# Patient Record
Sex: FEMALE | Race: WHITE | NOT HISPANIC OR LATINO | Employment: FULL TIME | URBAN - METROPOLITAN AREA
[De-identification: names, ages, dates, MRNs, and addresses within clinical notes are randomized per-mention and may not be internally consistent; named-entity substitution may affect disease eponyms.]

---

## 2017-03-06 ENCOUNTER — ALLSCRIPTS OFFICE VISIT (OUTPATIENT)
Dept: OTHER | Facility: OTHER | Age: 22
End: 2017-03-06

## 2017-12-21 ENCOUNTER — GENERIC CONVERSION - ENCOUNTER (OUTPATIENT)
Dept: OTHER | Facility: OTHER | Age: 22
End: 2017-12-21

## 2017-12-21 ENCOUNTER — LAB REQUISITION (OUTPATIENT)
Dept: LAB | Facility: HOSPITAL | Age: 22
End: 2017-12-21
Payer: COMMERCIAL

## 2017-12-21 DIAGNOSIS — Z01.419 ENCOUNTER FOR GYNECOLOGICAL EXAMINATION WITHOUT ABNORMAL FINDING: ICD-10-CM

## 2017-12-21 PROCEDURE — G0145 SCR C/V CYTO,THINLAYER,RESCR: HCPCS | Performed by: PHYSICIAN ASSISTANT

## 2017-12-28 ENCOUNTER — GENERIC CONVERSION - ENCOUNTER (OUTPATIENT)
Dept: FAMILY MEDICINE CLINIC | Facility: CLINIC | Age: 22
End: 2017-12-28

## 2017-12-28 ENCOUNTER — GENERIC CONVERSION - ENCOUNTER (OUTPATIENT)
Dept: OTHER | Facility: OTHER | Age: 22
End: 2017-12-28

## 2017-12-29 LAB
LAB AP GYN PRIMARY INTERPRETATION: NORMAL
Lab: NORMAL

## 2018-01-10 NOTE — MISCELLANEOUS
Message   Recorded as Task   Date: 11/28/2016 09:39 AM, Created By: Braeden Nails   Task Name: Med Renewal Request   Assigned To: Jacklyn Woo   Regarding Patient: Veronica Stallings, Status: Active   CommentCrikiran Bruno - 28 Nov 2016 9:39 AM     TASK CREATED  Caller: Mariya Delarosa, Mother; Renew Medication  Pt has appt 12/08 w [facility]  Pt needs refill on BC rx  Mariya Delarosa (mother) is at [phone]   Saul Abraham - 28 Nov 2016 9:43 AM     TASK REASSIGNED: Previously Assigned To Mariangel Taylor - 28 Nov 2016 10:31 AM     TASK EDITED  what pharm? Saul Abraham - 69 Nov 2016 1:54 PM     TASK EDITED  sent to pharm        Active Problems    1  Encounter for routine gynecological examination (V72 31) (Z01 419)   2  Mild exercise-induced asthma (493 81) (J45 990)   3  Tricuspid valve disorders, non-rheumatic (424 2) (I36 9)    Current Meds   1  Junel Fe 24 1-20 MG-MCG(24) Oral Tablet; Take 1 tablet daily; Therapy: [id number] to (Last Rx:[id number])  Requested for: 21Oct2016; Status: ACTIVE -   Renewal Denied Ordered    Allergies    1  No Known Drug Allergies    Plan  Encounter for routine gynecological examination    · Junel Fe 24 1-20 MG-MCG(24) Oral Tablet;  Take 1 tablet daily    Signatures   Electronically signed by : Karin Navarro, ; Nov 28 2016  1:54PM EST                       (Author)

## 2018-01-13 VITALS
DIASTOLIC BLOOD PRESSURE: 64 MMHG | OXYGEN SATURATION: 99 % | WEIGHT: 134 LBS | BODY MASS INDEX: 21.03 KG/M2 | HEART RATE: 72 BPM | SYSTOLIC BLOOD PRESSURE: 104 MMHG | HEIGHT: 67 IN

## 2018-01-24 VITALS
BODY MASS INDEX: 21.38 KG/M2 | SYSTOLIC BLOOD PRESSURE: 124 MMHG | WEIGHT: 133 LBS | HEIGHT: 66 IN | DIASTOLIC BLOOD PRESSURE: 80 MMHG

## 2018-01-29 DIAGNOSIS — R12 HEARTBURN: Primary | ICD-10-CM

## 2018-01-29 RX ORDER — RANITIDINE 300 MG/1
TABLET ORAL
Qty: 60 TABLET | Refills: 0 | Status: SHIPPED | OUTPATIENT
Start: 2018-01-29 | End: 2018-10-05 | Stop reason: SDUPTHER

## 2018-02-05 ENCOUNTER — TELEPHONE (OUTPATIENT)
Dept: FAMILY MEDICINE CLINIC | Facility: CLINIC | Age: 23
End: 2018-02-05

## 2018-02-05 ENCOUNTER — EVALUATION (OUTPATIENT)
Dept: PHYSICAL THERAPY | Facility: CLINIC | Age: 23
End: 2018-02-05
Payer: COMMERCIAL

## 2018-02-05 DIAGNOSIS — Z98.890 S/P ACL RECONSTRUCTION: Primary | ICD-10-CM

## 2018-02-05 PROCEDURE — 97162 PT EVAL MOD COMPLEX 30 MIN: CPT | Performed by: PHYSICAL THERAPIST

## 2018-02-05 PROCEDURE — 97014 ELECTRIC STIMULATION THERAPY: CPT | Performed by: PHYSICAL THERAPIST

## 2018-02-05 PROCEDURE — 97110 THERAPEUTIC EXERCISES: CPT | Performed by: PHYSICAL THERAPIST

## 2018-02-05 PROCEDURE — G0283 ELEC STIM OTHER THAN WOUND: HCPCS | Performed by: PHYSICAL THERAPIST

## 2018-02-05 NOTE — LETTER
2018    Des Kennedy MD  [address] [place] [address]    Patient: Zaynab Chapa   YOB: 1995   Date of Visit: 2018       Dear Dr Dhaliwal Sake:    Please review the attached Plan of Care from Kristen Kwok's recent visit  Please verify that you agree therapy should continue by signing the attached document and sending it back to our office  If you have any questions or concerns, please don't hesitate to call  Sincerely,    Julian Gaviria, PT      Referring Provider:      I certify that I have read the below Plan of Care and certify the need for these services furnished under this plan of treatment while under my care  Des Kennedy MD  [facility] 31: [phone]          PT Evaluation     Today's date: 2018  Patient name: Zaynab Chapa  : 1995  MRN: [id number]  Referring provider: Alwin Cogan, MD  Dx:   Encounter Diagnosis   Name Primary?  S/P ACL reconstruction Yes                  Assessment  Impairments: abnormal gait, abnormal muscle firing, abnormal or restricted ROM and pain with function    Assessment details: Pt should benefit from PT s/p ADL reconstruction  Understanding of Dx/Px/POC: excellent  Plan    Planned modality interventions: electrical stimulation/Russian stimulation and cryotherapy  Planned therapy interventions: balance/weight bearing training, stretching, strengthening, neuromuscular re-education, therapeutic activities and therapeutic exercise  Frequency: 3x week  Duration in visits: 20  Duration in weeks: 12  Treatment plan discussed with: patient        Subjective Evaluation    History of Present Illness  Date of surgery: 2018  Mechanism of injury: Basketball, planted and twisted knee    Senior at [employer], plays basketball and soccer  Quality of life: excellent    Pain  Current pain ratin          Objective Active Range of Motion   Left Knee   Flexion: 85 degrees   Extension: -10 degrees     Additional Active Range of Motion Details  Supine heel slide    Strength/Myotome Testing     Left Knee   Quadriceps contraction: poor    Ambulation     Observational Gait     Additional Observational Gait Details  Antalgic gait with crutches decreased ROM, lacks full extension      Precautions: ACL    Daily Treatment Diary     Manual  2/5            Patellar mobs x                                                                    Exercise Diary              bike 10'            LAQ no wt 20            Knee ext isomet 90, 60, 45 deg             march 5'            Retro walk 5'            SLB 4'            Calf raise on biodex 30            SLR x 4             TKE             Side step             Step ups             squats                                                                                                                         Modalities              NMES quad 10'

## 2018-02-05 NOTE — PROGRESS NOTES
PT Evaluation     Today's date: 2018  Patient name: Fern Boogie  : 1995  MRN: [id number]  Referring provider: Martin Desai MD  Dx:   Encounter Diagnosis   Name Primary?  S/P ACL reconstruction Yes                  Assessment  Impairments: abnormal gait, abnormal muscle firing, abnormal or restricted ROM and pain with function    Assessment details: Pt should benefit from PT s/p ADL reconstruction  Understanding of Dx/Px/POC: excellent  Plan    Planned modality interventions: electrical stimulation/Russian stimulation and cryotherapy  Planned therapy interventions: balance/weight bearing training, stretching, strengthening, neuromuscular re-education, therapeutic activities and therapeutic exercise  Frequency: 3x week  Duration in visits: 20  Duration in weeks: 12  Treatment plan discussed with: patient        Subjective Evaluation    History of Present Illness  Date of surgery: 2018  Mechanism of injury: Basketball, planted and twisted knee    Senior at [employer], plays basketball and soccer  Quality of life: excellent    Pain  Current pain ratin          Objective     Active Range of Motion   Left Knee   Flexion: 85 degrees   Extension: -10 degrees     Additional Active Range of Motion Details  Supine heel slide    Strength/Myotome Testing     Left Knee   Quadriceps contraction: poor    Ambulation     Observational Gait     Additional Observational Gait Details  Antalgic gait with crutches decreased ROM, lacks full extension      Precautions: ACL    Daily Treatment Diary     Manual              Patellar mobs x                                                                    Exercise Diary              bike 10'            LAQ no wt 20            Knee ext isomet 90, 60, 45 deg             march 5'            Retro walk 5'            SLB 4'            Calf raise on biodex 30            SLR x 4             TKE             Side step             Step ups             squats Modalities              NMES quad 10'

## 2018-02-06 ENCOUNTER — TRANSCRIBE ORDERS (OUTPATIENT)
Dept: PHYSICAL THERAPY | Facility: CLINIC | Age: 23
End: 2018-02-06

## 2018-02-06 DIAGNOSIS — Z98.890 PERSONAL HISTORY OF SURGERY TO HEART AND GREAT VESSELS, PRESENTING HAZARDS TO HEALTH: Primary | ICD-10-CM

## 2018-02-07 ENCOUNTER — OFFICE VISIT (OUTPATIENT)
Dept: PHYSICAL THERAPY | Facility: CLINIC | Age: 23
End: 2018-02-07
Payer: COMMERCIAL

## 2018-02-07 DIAGNOSIS — S83.512D RUPTURE OF ANTERIOR CRUCIATE LIGAMENT OF LEFT KNEE, SUBSEQUENT ENCOUNTER: ICD-10-CM

## 2018-02-07 PROCEDURE — 97010 HOT OR COLD PACKS THERAPY: CPT

## 2018-02-07 PROCEDURE — 97116 GAIT TRAINING THERAPY: CPT

## 2018-02-07 PROCEDURE — 97530 THERAPEUTIC ACTIVITIES: CPT

## 2018-02-07 PROCEDURE — 97110 THERAPEUTIC EXERCISES: CPT

## 2018-02-07 NOTE — PROGRESS NOTES
Daily Note     Today's date: 2018  Patient name: Debbie Pradhan  : 1995  MRN: [id number]  Referring provider: Yris Landurm MD  Dx: No diagnosis found  Subjective: Patient complains the left knee is feeling stiff  Objective: See treatment diary below  Precautions: L ACL repair 18    Daily Treatment Diary   X=performed    Manual             Patellar mobs x x               Exercise Diary   18           bike 10' x           LAQ no wt 20 1x20           Knee ext isomet 90, 60, 45 deg  5"x10           march 5' 4'           Retro walk 5' 4'           SLB 4'            Calf raise 30 2x30           SLR x 4  2x10           TKE  Green  1x20           Side step  4'           Step ups  4" x 20           squats                 Modalities              NMES quad 10' 10'           CP post tx  10'             Assessment: Fatigue reported throughout therapeutic exercise program today  Plan: Continue per plan of care

## 2018-02-09 ENCOUNTER — OFFICE VISIT (OUTPATIENT)
Dept: PHYSICAL THERAPY | Facility: CLINIC | Age: 23
End: 2018-02-09
Payer: COMMERCIAL

## 2018-02-09 DIAGNOSIS — M25.562 ACUTE PAIN OF LEFT KNEE: ICD-10-CM

## 2018-02-09 PROCEDURE — 97530 THERAPEUTIC ACTIVITIES: CPT

## 2018-02-09 PROCEDURE — 97010 HOT OR COLD PACKS THERAPY: CPT

## 2018-02-09 PROCEDURE — 97110 THERAPEUTIC EXERCISES: CPT

## 2018-02-09 NOTE — PROGRESS NOTES
Daily Note     Today's date: 2018  Patient name: Fern Boogie  : 1995  MRN: [id number]  Referring provider: Martin Desai MD  Dx: No diagnosis found  Subjective: Patient complains the left knee felt sore after the last visit however "it wasn't bad "    Objective: See treatment diary below  Precautions: L ACL repair 18    Daily Treatment Diary   X=performed    Manual  18          Patellar mobs x x x              Exercise Diary   18          bike 10' x x          LAQ no wt 20 1x20 x          Knee ext isomet 90, 60, 45 deg  5"x10 x          march 5' 4' x          Retro walk 5' 4' x          SLB 4'            Calf raises 30 2x30 x          SLR x 4  2x10 x          TKE  Green  1x20 2x20          Side step  4' x          Step ups  4" x 20 x          squats   2x10              Modalities             NMES quad 10' 10' x          CP post tx  10' x            Assessment: Additional squats are performed with complaints of fatigue  Patient tolerates NMES well therefore provided her with unit for home use  Plan: Continue per plan of care

## 2018-02-12 ENCOUNTER — OFFICE VISIT (OUTPATIENT)
Dept: PHYSICAL THERAPY | Facility: CLINIC | Age: 23
End: 2018-02-12
Payer: COMMERCIAL

## 2018-02-12 DIAGNOSIS — Z98.890 S/P ACL RECONSTRUCTION: Primary | ICD-10-CM

## 2018-02-12 PROCEDURE — 97112 NEUROMUSCULAR REEDUCATION: CPT

## 2018-02-12 PROCEDURE — 97010 HOT OR COLD PACKS THERAPY: CPT

## 2018-02-12 PROCEDURE — 97530 THERAPEUTIC ACTIVITIES: CPT

## 2018-02-12 PROCEDURE — 97110 THERAPEUTIC EXERCISES: CPT

## 2018-02-12 NOTE — PROGRESS NOTES
Daily Note     Today's date: 2018  Patient name: Kathy Alston  : 1995  MRN: [id number]  Referring provider: Brigette Whitmore MD  Dx:   Encounter Diagnosis   Name Primary?  S/P ACL reconstruction Yes                  Subjective: Patient reports her left knee felt "a little bit sore" after Friday's PT visit  Patient reports she was able to successfully use the home NMES unit over the weekend  Objective: See treatment diary below  Precautions: L ACL repair 18    Daily Treatment Diary   X=performed    Manual  18         Patellar mobs x x x x             Exercise Diary   18         bike 10' x x x         LAQ no wt 20 1x20 x 3x10         Knee ext isomet 90, 60, 45 deg  5"x10 x x         march 5' 4' x x         Retro walk 5' 4' x x         SLB 4'   30"x2         Calf raises 30 2x30 x x         SLR x 4  2x10 x 3x10         TKE  Green  1x20 2x20 x         Side step  4' x x         Step ups  4" x 20 x x         squats   2x10 x         Heel slides    5"x20             Modalities            NMES quad 10' 10' x HEP         CP post tx  10' x x           Assessment: Added single leg balance as noted with complaints of left lower extremity fatigue  Plan: Continue per plan of care

## 2018-02-14 ENCOUNTER — OFFICE VISIT (OUTPATIENT)
Dept: PHYSICAL THERAPY | Facility: CLINIC | Age: 23
End: 2018-02-14
Payer: COMMERCIAL

## 2018-02-14 DIAGNOSIS — M25.562 ACUTE PAIN OF LEFT KNEE: ICD-10-CM

## 2018-02-14 PROCEDURE — 97140 MANUAL THERAPY 1/> REGIONS: CPT

## 2018-02-14 PROCEDURE — 97010 HOT OR COLD PACKS THERAPY: CPT

## 2018-02-14 PROCEDURE — 97110 THERAPEUTIC EXERCISES: CPT

## 2018-02-14 NOTE — PROGRESS NOTES
Daily Note     Today's date: 2018  Patient name: Janet Valente  : 1995  MRN: [id number]  Referring provider: Jason Soto MD  Dx:   No diagnosis found  Subjective: No significant changes in left knee pain to report since the last visit  Objective: See treatment diary below  Precautions: L ACL repair 18    Daily Treatment Diary   X=performed    Manual  18        Patellar mobs x x x x x            Exercise Diary   18        bike 10' x x x x        LAQ no wt 20 1x20 x 3x10 x        Knee ext isomet 90, 60, 45 deg  5"x10 x x x        ' 4' x x x        Retro walk 5' 4' x x x        SLB 4'   30"x2 30"x3        Calf raises 30 2x30 x x x        SLR x 4  2x10 x 3x10 x        TKE  Green  1x20 2x20 x Black  3x10        Side step  4' x x x        Step ups  4" x 20 x x 6" x 20        squats   2x10 x x        Heel slides    5"x20 x        Prone quad stretch     30"x4            Modalities           NMES quad 10' 10' x HEP HEP        CP post tx  10' x x x          Assessment: Additional prone quad stretching is tolerated well  Patient reports slr exercises are becoming easier to perform  Plan: Continue per plan of care

## 2018-02-16 ENCOUNTER — OFFICE VISIT (OUTPATIENT)
Dept: PHYSICAL THERAPY | Facility: CLINIC | Age: 23
End: 2018-02-16
Payer: COMMERCIAL

## 2018-02-16 DIAGNOSIS — M25.562 ACUTE PAIN OF LEFT KNEE: ICD-10-CM

## 2018-02-16 PROCEDURE — 97112 NEUROMUSCULAR REEDUCATION: CPT

## 2018-02-16 PROCEDURE — 97110 THERAPEUTIC EXERCISES: CPT

## 2018-02-16 PROCEDURE — 97530 THERAPEUTIC ACTIVITIES: CPT

## 2018-02-16 NOTE — PROGRESS NOTES
Daily Note     Today's date: 2018  Patient name: Sundar Valladares  : 1995  MRN: [id number]  Referring provider: Elda Alpers, MD  Dx:   No diagnosis found  Subjective: No significant changes in left knee pain to report since the last visit  Objective: See treatment diary below  Precautions: L ACL repair 18    Daily Treatment Diary   X=performed    Manual  18       Patellar mobs x x x x x x           Exercise Diary   18       bike 10' x x x x x       LAQ no wt 20 1x20 x 3x10 x x       Knee ext isomet 90, 60, 45 deg  5"x10 x x x x       ' 4' x x x x       Retro walk 5' 4' x x x x       SLB 4'   30"x2 30"x3 x       Calf raises 30 2x30 x x x x       SLR x 4  2x10 x 3x10 x x       TKE  Green  1x20 2x20 x Black  3x10 x       Side step  4' x x x Red tb       Step ups  4" x 20 x x 6" x 20 3x10       squats   2x10 x x 3x10       Heel slides    5"x20 x x       Prone quad stretch     30"x4 x           Modalities          NMES quad 10' 10' x HEP HEP HEP       CP post tx  10' x x x HEP         Assessment: Progressed to performing sidestepping with the red theraband without complaints  Plan: Continue per plan of care

## 2018-02-19 ENCOUNTER — OFFICE VISIT (OUTPATIENT)
Dept: PHYSICAL THERAPY | Facility: CLINIC | Age: 23
End: 2018-02-19
Payer: COMMERCIAL

## 2018-02-19 DIAGNOSIS — M25.562 ACUTE PAIN OF LEFT KNEE: Primary | ICD-10-CM

## 2018-02-19 DIAGNOSIS — S83.512D RUPTURE OF ANTERIOR CRUCIATE LIGAMENT OF LEFT KNEE, SUBSEQUENT ENCOUNTER: ICD-10-CM

## 2018-02-19 DIAGNOSIS — Z98.890 S/P ACL RECONSTRUCTION: ICD-10-CM

## 2018-02-19 PROCEDURE — 97112 NEUROMUSCULAR REEDUCATION: CPT | Performed by: PHYSICAL THERAPIST

## 2018-02-19 PROCEDURE — 97140 MANUAL THERAPY 1/> REGIONS: CPT | Performed by: PHYSICAL THERAPIST

## 2018-02-19 PROCEDURE — 97110 THERAPEUTIC EXERCISES: CPT | Performed by: PHYSICAL THERAPIST

## 2018-02-19 PROCEDURE — 97530 THERAPEUTIC ACTIVITIES: CPT | Performed by: PHYSICAL THERAPIST

## 2018-02-19 NOTE — PROGRESS NOTES
Daily Note     Today's date: 2018  Patient name: Fortino Randall  : 1995  MRN: [id number]  Referring provider: Jefry Pearce MD  Dx:   Encounter Diagnoses   Name Primary?  Acute pain of left knee Yes    S/P ACL reconstruction     Rupture of anterior cruciate ligament of left knee, subsequent encounter                   Subjective: reports some discomfort in patellar tendon region  Objective: See treatment diary below  Precautions: L ACL repair 18    Daily Treatment Diary   X=performed    Manual  18      Patellar mobs x x x x x x           Exercise Diary   18            bike 10' x x x x x random           LAQ no wt 20 1x20 x 3x10 x x Apollo 2 plates I82           Knee ext isomet 90, 60, 45 deg  5"x10 x x x x x           march 5' 4' x x x x np           Retro walk 5' 4' x x x x np           SLB 4'   30"x2 30"x3 x x           Calf raises 30 2x30 x x x x x           SLR x 4  2x10 x 3x10 x x            TKE  Green  1x20 2x20 x Black  3x10 x x           Side step  4' x x x Red tb x           Step ups  4" x 20 x x 6" x 20 3x10 x           squats   2x10 x x 3x10 x           Heel slides    5"x20 x x HEP           Ham curls apollo       2 plates B27           Leg press       2 plates M34                                                                                   Prone quad stretch     30"x4 x 1 min x3               Modalities          NMES quad 10' 10' x HEP HEP HEP np      CP post tx  10' x x x HEP np        Assessment: fatigued and challenged with increased intensity and new exs today  Plan: Continue per plan of care  Progress treatment as tolerated

## 2018-02-21 ENCOUNTER — OFFICE VISIT (OUTPATIENT)
Dept: PHYSICAL THERAPY | Facility: CLINIC | Age: 23
End: 2018-02-21
Payer: COMMERCIAL

## 2018-02-21 DIAGNOSIS — Z98.890 S/P ACL RECONSTRUCTION: ICD-10-CM

## 2018-02-21 DIAGNOSIS — M25.562 ACUTE PAIN OF LEFT KNEE: Primary | ICD-10-CM

## 2018-02-21 DIAGNOSIS — S83.512D RUPTURE OF ANTERIOR CRUCIATE LIGAMENT OF LEFT KNEE, SUBSEQUENT ENCOUNTER: ICD-10-CM

## 2018-02-21 PROCEDURE — 97112 NEUROMUSCULAR REEDUCATION: CPT | Performed by: PHYSICAL THERAPIST

## 2018-02-21 PROCEDURE — 97110 THERAPEUTIC EXERCISES: CPT | Performed by: PHYSICAL THERAPIST

## 2018-02-21 NOTE — PROGRESS NOTES
Daily Note     Today's date: 2018  Patient name: Neeru Luis  : 1995  MRN: [id number]  Referring provider: Faiza Anguiano MD  Dx:   Encounter Diagnoses   Name Primary?  Acute pain of left knee Yes    S/P ACL reconstruction     Rupture of anterior cruciate ligament of left knee, subsequent encounter                   Subjective: Patient stated no significant pain prior to treatment session; patient states physician D/C brace  Objective: See treatment diary below  Precautions: L ACL repair 18    Daily Treatment Diary   X=performed    Manual  18     Patellar mobs x x x x x x  KK         Exercise Diary   18          bike 10' x x x x x random random          LAQ no wt 20 1x20 x 3x10 x x Apollo 2 plates N96 Apollo 2 plates A45          Knee ext isomet 90, 60, 45 deg  5"x10 x x x x x 5"x10          march 5' 4' x x x x np np          Retro walk 5' 4' x x x x np np          SLB 4'   30"x2 30"x3 x x 30"x3          Calf raises 30 2x30 x x x x x 2x30          SLR x 4  2x10 x 3x10 x x  3x10          TKE  Green  1x20 2x20 x Black  3x10 x x Black 3x10          Side step  4' x x x Red tb x Red tb          Step ups  4" x 20 x x 6" x 20 3x10 x 10" 3x10          squats   2x10 x x 3x10 x 3x10          Heel slides    5"x20 x x HEP HEP          Ham curls apollo       2 plates V06 2 plates 37F          Leg press       2 plates Q77 1 leg [id number]                                                                                  Prone quad stretch     30"x4 x 1 min x3 1' x3              Modalities        NMES quad 10' 10' x HEP HEP HEP np np     CP post tx  10' x x x HEP np np       Assessment: Patient performed progressions to 1 leg on leg press and to 10" step without c/o pain; no c/o pain at completion of treatment session  Plan: Continue per plan of care    Progress treatment as tolerated

## 2018-02-23 ENCOUNTER — OFFICE VISIT (OUTPATIENT)
Dept: PHYSICAL THERAPY | Facility: CLINIC | Age: 23
End: 2018-02-23
Payer: COMMERCIAL

## 2018-02-23 DIAGNOSIS — Z98.890 S/P ACL RECONSTRUCTION: Primary | ICD-10-CM

## 2018-02-23 PROCEDURE — 97112 NEUROMUSCULAR REEDUCATION: CPT

## 2018-02-23 PROCEDURE — 97530 THERAPEUTIC ACTIVITIES: CPT

## 2018-02-23 PROCEDURE — 97110 THERAPEUTIC EXERCISES: CPT

## 2018-02-23 NOTE — PROGRESS NOTES
Daily Note     Today's date: 2018  Patient name: Gavi Fowler  : 1995  MRN: [id number]  Referring provider: Raeann Mohs, MD  Dx:   No diagnosis found  Subjective: Patient reports continued improvements in left knee pain  Objective: See treatment diary below  Precautions: L ACL repair 18    Daily Treatment Diary   X=performed    Manual  18    Patellar mobs x x x x x x  KK x        Exercise Diary   18         bike 10' x x x x x random random x         LAQ no wt 20 1x20 x 3x10 x x Apollo 2 plates O96 Apollo 2 plates W22 x         Knee ext isomet 90, 60, 45 deg  5"x10 x x x x x 5"x10 Manual  Resist   5"x10         march 5' 4' x x x x np np NP         Retro walk 5' 4' x x x x np np NP         SLB 4'   30"x2 30"x3 x x 30"x3 x         Calf raises 30 2x30 x x x x x 2x30 x         SLR x 4  2x10 x 3x10 x x  3x10 x         TKE  Green  1x20 2x20 x Black  3x10 x x Black 3x10 x         Side step  4' x x x Red tb x Red tb x         Step ups  4" x 20 x x 6" x 20 3x10 x 10" 3x10 x         squats   2x10 x x 3x10 x 3x10 x         Heel slides    5"x20 x x HEP HEP          Ham curls apollo       2 plates J50 2 plates 07D x         Leg press       2 plates B71 1 leg [id number] x                                                                                 Prone quad stretch     30"x4 x 1 min x3 1' x3 x             Modalities       NMES quad 10' 10' x HEP HEP HEP np np NP    CP post tx  10' x x x HEP np np NP      Assessment: Patient complains of left lower extremity fatigue when performing the single leg balance  Plan: Continue per plan of care  Progress treatment as tolerated

## 2018-02-26 ENCOUNTER — OFFICE VISIT (OUTPATIENT)
Dept: PHYSICAL THERAPY | Facility: CLINIC | Age: 23
End: 2018-02-26
Payer: COMMERCIAL

## 2018-02-26 DIAGNOSIS — Z98.890 S/P ACL RECONSTRUCTION: Primary | ICD-10-CM

## 2018-02-26 PROCEDURE — 97112 NEUROMUSCULAR REEDUCATION: CPT | Performed by: PHYSICAL THERAPIST

## 2018-02-26 PROCEDURE — 97110 THERAPEUTIC EXERCISES: CPT | Performed by: PHYSICAL THERAPIST

## 2018-02-26 NOTE — PROGRESS NOTES
Daily Note     Today's date: 2018  Patient name: Brigitte Cotto  : 1995  MRN: [id number]  Referring provider: Chen Cherry MD  Dx:   Encounter Diagnosis   Name Primary?  S/P ACL reconstruction Yes                  Subjective: Patient reports continued improvements in left knee pain  Objective: See treatment diary below  Precautions: L ACL repair 18    Daily Treatment Diary   X=performed    Manual  18   Patellar mobs x x x x x x  KK x x       Exercise Diary   18        bike 10' x x x x x random random x x        LAQ no wt 20 1x20 x 3x10 x x Apollo 2 plates Y33 Apollo 2 plates K50 x x        Knee ext isomet 90, 60, 45 deg  5"x10 x x x x x 5"x10 Manual  Resist   5"x10 np        ' 4' x x x x np np NP         Monster walk          12'x6        SLB 4'   30"x2 30"x3 x x 30"x3 x x        Calf raises 30 2x30 x x x x x 2x30 x single 3x10        SLR x 4  2x10 x 3x10 x x  3x10 x         TKE  Green  1x20 2x20 x Black  3x10 x x Black 3x10 x         Side step  4' x x x Red tb x Red tb x 1x6'        Step ups  4" x 20 x x 6" x 20 3x10 x 10" 3x10 x x        squats   2x10 x x 3x10 x 3x10 x x        Heel slides    5"x20 x x HEP HEP          Ham curls apollo       2 plates Q32 2 plates 11F x x        Leg press single       2 plates P89 1 leg [id number] x 2 plates 4V89        Wall slides hold 10 sec          10        stepper          5 min                                            Prone quad stretch     30"x4 x 1 min x3 1' x3 x x            Modalities       NMES quad 10' 10' x HEP HEP HEP np np NP    CP post tx  10' x x x HEP np np NP      Assessment: Patient complains of left lower extremity fatigue when performing the single leg balance  Plan: Continue per plan of care  Progress treatment as tolerated

## 2018-02-28 ENCOUNTER — OFFICE VISIT (OUTPATIENT)
Dept: PHYSICAL THERAPY | Facility: CLINIC | Age: 23
End: 2018-02-28
Payer: COMMERCIAL

## 2018-02-28 DIAGNOSIS — Z98.890 S/P ACL RECONSTRUCTION: Primary | ICD-10-CM

## 2018-02-28 PROCEDURE — 97530 THERAPEUTIC ACTIVITIES: CPT | Performed by: PHYSICAL THERAPIST

## 2018-02-28 PROCEDURE — 97112 NEUROMUSCULAR REEDUCATION: CPT | Performed by: PHYSICAL THERAPIST

## 2018-02-28 PROCEDURE — 97110 THERAPEUTIC EXERCISES: CPT | Performed by: PHYSICAL THERAPIST

## 2018-02-28 NOTE — PROGRESS NOTES
Daily Note     Today's date: 2018  Patient name: Carlos Loya  : 1995  MRN: [id number]  Referring provider: Sandro Mariano MD  Dx:   Encounter Diagnosis   Name Primary?  S/P ACL reconstruction Yes                  Subjective: Patient reports continued improvements in left knee pain  Objective: See treatment diary below  Precautions: L ACL repair 18    Daily Treatment Diary   X=performed    Manual  18   Patellar mobs x x x x x x  KK x x       Exercise Diary   18       bike 10' x x x x x random random x x x       LAQ no wt 20 1x20 x 3x10 x x [place] 2 plates O06 Apollo 2 plates G54 x x 2 [id number]                         march 5' 4' x x x x np np NP         Monster walk          12'x6 x       SLB 4'   30"x2 30"x3 x x 30"x3 x x x       Calf raises 30 2x30 x x x x x 2x30 x single 3x10 x       SLR x 4  2x10 x 3x10 x x  3x10 x  1# 3x10       TKE  Green  1x20 2x20 x Black  3x10 x x Black 3x10 x         Side step  4' x x x Red tb x Red tb x 1x6' 12'x6       Step ups  4" x 20 x x 6" x 20 3x10 x 10" 3x10 x x x       squats   2x10 x x 3x10 x 3x10 x x 30       Heel slides    5"x20 x x HEP HEP          Ham curls apollo       2 plates Q78 2 plates 58U x x x       Leg press single       2 plates S51 1 leg [id number] x 2 plates 0H05 3 plates       Wall slides hold 10 sec          10 15       stepper          5 min 5                                           Prone quad stretch     30"x4 x 1 min x3 1' x3 x x x           Modalities       NMES quad 10' 10' x HEP HEP HEP np np NP    CP post tx  10' x x x HEP np np NP      Assessment: Patient complains of left lower extremity fatigue when performing the single leg balance  Plan: Continue per plan of care  Progress treatment as tolerated

## 2018-03-02 ENCOUNTER — APPOINTMENT (OUTPATIENT)
Dept: PHYSICAL THERAPY | Facility: CLINIC | Age: 23
End: 2018-03-02
Payer: COMMERCIAL

## 2018-03-05 ENCOUNTER — APPOINTMENT (OUTPATIENT)
Dept: PHYSICAL THERAPY | Facility: CLINIC | Age: 23
End: 2018-03-05
Payer: COMMERCIAL

## 2018-03-12 ENCOUNTER — OFFICE VISIT (OUTPATIENT)
Dept: PHYSICAL THERAPY | Facility: CLINIC | Age: 23
End: 2018-03-12
Payer: COMMERCIAL

## 2018-03-12 DIAGNOSIS — Z98.890 S/P ACL RECONSTRUCTION: Primary | ICD-10-CM

## 2018-03-12 DIAGNOSIS — M25.562 ACUTE PAIN OF LEFT KNEE: ICD-10-CM

## 2018-03-12 DIAGNOSIS — S83.512D RUPTURE OF ANTERIOR CRUCIATE LIGAMENT OF LEFT KNEE, SUBSEQUENT ENCOUNTER: ICD-10-CM

## 2018-03-12 PROCEDURE — 97112 NEUROMUSCULAR REEDUCATION: CPT | Performed by: PHYSICAL THERAPIST

## 2018-03-12 PROCEDURE — 97110 THERAPEUTIC EXERCISES: CPT | Performed by: PHYSICAL THERAPIST

## 2018-03-12 NOTE — PROGRESS NOTES
Daily Note     Today's date: 3/12/2018  Patient name: Zaynab Chapa  : 1995  MRN: [id number]  Referring provider: Alwin Cogan, MD  Dx:   Encounter Diagnoses   Name Primary?  S/P ACL reconstruction Yes    Acute pain of left knee     Rupture of anterior cruciate ligament of left knee, subsequent encounter                   Subjective: Patient reports continued improvements in left knee pain  Objective: See treatment diary below  Precautions: L ACL repair 18    Daily Treatment Diary   X=performed        Exercise Diary                   bike [place] L6 10'          x       LAQ  3 plates 3Q94          2 [id number]                                           Monster walk Green x6          x       SLB           x       Calf raises single 3x10          x       SLR standin ways Green to fatigue          1# 3x10       TKE np                 Side step g x6          12'x6       Step ups: frwd, side 10" x30          x       Squats - single 3x10          30                         Ham curls apollo 3 plates 7H66          x       Leg press single 3 pl 3x10          3 plates       Wall slides hold 10 sec 2x10          15       stepper 5'          5       biodex balance 4'                 Dead lifts nv                 Prone quad stretch 30" x3          x             Assessment: Patient complains of left lower extremity fatigue when performing the single leg balance  Plan: Continue per plan of care  Progress treatment as tolerated

## 2018-03-14 ENCOUNTER — OFFICE VISIT (OUTPATIENT)
Dept: PHYSICAL THERAPY | Facility: CLINIC | Age: 23
End: 2018-03-14
Payer: COMMERCIAL

## 2018-03-14 DIAGNOSIS — S83.512D RUPTURE OF ANTERIOR CRUCIATE LIGAMENT OF LEFT KNEE, SUBSEQUENT ENCOUNTER: ICD-10-CM

## 2018-03-14 DIAGNOSIS — M25.562 ACUTE PAIN OF LEFT KNEE: ICD-10-CM

## 2018-03-14 DIAGNOSIS — Z98.890 S/P ACL RECONSTRUCTION: Primary | ICD-10-CM

## 2018-03-14 PROCEDURE — 97112 NEUROMUSCULAR REEDUCATION: CPT | Performed by: PHYSICAL THERAPIST

## 2018-03-14 PROCEDURE — 97110 THERAPEUTIC EXERCISES: CPT | Performed by: PHYSICAL THERAPIST

## 2018-03-14 NOTE — PROGRESS NOTES
Daily Note     Today's date: 3/14/2018  Patient name: Carlos Loya  : 1995  MRN: [id number]  Referring provider: Sandro Mariano MD  Dx:   Encounter Diagnoses   Name Primary?  S/P ACL reconstruction Yes    Acute pain of left knee     Rupture of anterior cruciate ligament of left knee, subsequent encounter        Start Time: 1400  Stop Time: 1520  Total time in clinic (min): 80 minutes    Subjective: Patient reports mild "stiffness" in her left knee prior to session  She reports 0/10 left knee pain pre/post session  Objective: See treatment diary below  Precautions: L ACL repair 18    Daily Treatment Diary   X=performed        Exercise Diary   3/14                bike [place] L6 10' Random  L6 x 10 min                LAQ  3 plates 9P11 3 plates  0/22                                                    Monster walk Green x6 Green   X 6                SLB                  Calf raises single 3x10 3x10                SLR standin ways Green to fatigue Green TB to fatigue   B/L                TKE np                 Side step g x6 Green   6 laps                Step ups: frwd, side 10" x30 10" step  X 30 ea                Squats - single 3x10 3x10                                  Ham curls apollo 3 plates 9U17 3 plates  7U71                Leg press single 3 pl 3x10 3 plates  9L61                Wall slides hold 10 sec 2x10 2x10                stepper 5' 5 min  Speed 30                biodex balance 4' Catch Game x 4min                Dead lifts nv                 Prone quad stretch 30" x3 30" x 3                      Assessment: Patient demonstrated good overall knowledge, tolerance and performance with current program reporting no left knee pain pre/post session  Pt reported mild fatigue with completion of strengthening program  Pt will benefit from continued skilled PT services in order to address her remaining limitations and to restore prior level of function      Plan: Continue per plan of care  Progress treatment as tolerated

## 2018-03-16 ENCOUNTER — OFFICE VISIT (OUTPATIENT)
Dept: PHYSICAL THERAPY | Facility: CLINIC | Age: 23
End: 2018-03-16
Payer: COMMERCIAL

## 2018-03-16 DIAGNOSIS — M25.562 ACUTE PAIN OF LEFT KNEE: ICD-10-CM

## 2018-03-16 DIAGNOSIS — Z98.890 S/P ACL RECONSTRUCTION: Primary | ICD-10-CM

## 2018-03-16 DIAGNOSIS — S83.512D RUPTURE OF ANTERIOR CRUCIATE LIGAMENT OF LEFT KNEE, SUBSEQUENT ENCOUNTER: ICD-10-CM

## 2018-03-16 PROCEDURE — 97112 NEUROMUSCULAR REEDUCATION: CPT

## 2018-03-16 PROCEDURE — 97110 THERAPEUTIC EXERCISES: CPT

## 2018-03-16 NOTE — PROGRESS NOTES
Daily Note     Today's date: 3/16/2018  Patient name: Severa Calkins  : 1995  MRN: [id number]  Referring provider: Mary Philippe MD  Dx:   Encounter Diagnosis     ICD-10-CM    1  S/P ACL reconstruction Z98 890    2  Acute pain of left knee M25 562    3  Rupture of anterior cruciate ligament of left knee, subsequent encounter S83 512D                   Subjective: Patient reported she is doing well  Was a little sore after LV  Notices stiffness in knee especially when it gets colder out  Objective: See treatment diary below      Assessment: No pain reported during today's activities  Patient is progressing well  Reports she tends to be too careful  Patient instructed she is able to perform more HS and calf stretching as she was unsure if she was able to do stretches other than performed in PT  Tolerated treatment well  Progressed per TE chart  Patient exhibited good technique with therapeutic exercises and would benefit from continued PT      Plan: Continue per plan of care       Exercise Diary    3/14  3/16                           bike [place] L6 10' Random  L6 x 10 min  Random  L6 x 10 min                           LAQ  3 plates 1A36 3 plates    3 plates  3/41                            Ball toss     Blue foam 4# 3x30                                                             Monster walk Green x6 Green   X 6  Blue x6                           SLB                                 Calf raises single 3x10 3x10  3x10                           SLR standin ways Green to fatigue Green TB to fatigue   B/L  Blue B/L                           TKE np                               Side step g x6 Green   6 laps  Blue 6 laps                           Step ups: frwd, side 10" x30 10" step  X 30 ea  10" step  X 30 ea                           Squats - single 3x10 3x10  3x10                                                             Ham curls apollo 3 plates 0H57 3 plates  8A16  3 plates  1K90                           Leg press single 3 pl 3x10 3 plates  2E59  3 plates  4C87                           Wall slides hold 10 sec 2x10 2x10  2x10                           stepper 5' 5 min  Speed 30  5 min  Speed 30                           biodex balance 4' Catch Game x 4min  Catch Game x 4min                           Dead lifts nv                               Prone quad stretch 30" x3 30" x 3  30" x 3

## 2018-03-19 ENCOUNTER — OFFICE VISIT (OUTPATIENT)
Dept: PHYSICAL THERAPY | Facility: CLINIC | Age: 23
End: 2018-03-19
Payer: COMMERCIAL

## 2018-03-19 DIAGNOSIS — Z98.890 S/P ACL RECONSTRUCTION: Primary | ICD-10-CM

## 2018-03-19 DIAGNOSIS — S83.512D RUPTURE OF ANTERIOR CRUCIATE LIGAMENT OF LEFT KNEE, SUBSEQUENT ENCOUNTER: ICD-10-CM

## 2018-03-19 DIAGNOSIS — M25.562 ACUTE PAIN OF LEFT KNEE: ICD-10-CM

## 2018-03-19 PROCEDURE — 97110 THERAPEUTIC EXERCISES: CPT | Performed by: PHYSICAL THERAPIST

## 2018-03-19 NOTE — PROGRESS NOTES
Daily Note     Today's date: 3/19/2018  Patient name: Hermelinda Giang  : 1995  MRN: [id number]  Referring provider: Gabe Russo MD  Dx:   Encounter Diagnosis     ICD-10-CM    1  S/P ACL reconstruction Z98 890    2  Acute pain of left knee M25 562    3  Rupture of anterior cruciate ligament of left knee, subsequent encounter S83 512D                   Subjective: Patient reported she is doing well  Was a little sore after LV  Notices stiffness in knee especially when it gets colder out  Objective: See treatment diary below      Assessment: fatigued with ex  Plan: Continue per plan of care       Exercise Diary    3/14  3/16  3/19                 bike [place] L6 10' Random  L6 x 10 min  Random  L6 x 10 min  x                 LAQ  3 plates 8K02 3 plates  9/64  3 plates  8/09  x                  Ball toss     Blue foam 4# 3x30  x                                           Monster walk Green x6 Green   X 6  Blue x6  x                 SLB                         Calf raises single 3x10 3x10  3x10  x                 SLR standin ways Green to fatigue Green TB to fatigue   B/L  Blue B/L                   TKE np                       Side step g x6 Green   6 laps  Blue 6 laps  x                 Step ups: frwd, side 10" x30 10" step  X 30 ea  10" step  X 30 ea  8#dbl x30                 Squats - single 3x10 3x10  3x10  8#                                           Ham curls apollo 3 plates 0I32 3 plates  8H68  3 plates  8N10  x                 Leg press single 3 pl 3x10 3 plates  9X56  3 plates  4W13  x                 Wall slides hold 10 sec 2x10 2x10  2x10  x                 stepper 5' 5 min  Speed 30  5 min  Speed 30  x                 biodex balance 4' Catch Game x 4min  Catch Game x 4min                   Dead lifts nv      30                 Prone quad stretch 30" x3 30" x 3  30" x 3  x                 lunges    nv

## 2018-03-21 ENCOUNTER — APPOINTMENT (OUTPATIENT)
Dept: PHYSICAL THERAPY | Facility: CLINIC | Age: 23
End: 2018-03-21
Payer: COMMERCIAL

## 2018-03-23 ENCOUNTER — OFFICE VISIT (OUTPATIENT)
Dept: PHYSICAL THERAPY | Facility: CLINIC | Age: 23
End: 2018-03-23
Payer: COMMERCIAL

## 2018-03-23 DIAGNOSIS — Z98.890 S/P ACL RECONSTRUCTION: Primary | ICD-10-CM

## 2018-03-23 PROCEDURE — 97112 NEUROMUSCULAR REEDUCATION: CPT

## 2018-03-23 PROCEDURE — 97110 THERAPEUTIC EXERCISES: CPT

## 2018-03-23 NOTE — PROGRESS NOTES
Daily Note     Today's date: 3/23/2018  Patient name: Cameron Sotelo  : 1995  MRN: [id number]  Referring provider: Jr Sosa MD  Dx:   No diagnosis found  Subjective: No significant changes to report since the last visit  Objective: See treatment diary below  Exercise Diary    3/14  3/16  3/19  3/23               bike [place] L6 10' Random  L6 x 10 min  Random  L6 x 10 min  x  x               LAQ  3 plates 8J03 3 plates  6/50  3 plates    x  x                Ball toss     Blue foam 4# 3x30  x  x                                         Monster walk Green x6 Green   X 6  Blue x6  x  x               SLB                         Calf raises single 3x10 3x10  3x10  x  x               SLR standin ways Green to fatigue Green TB to fatigue   B/L  Blue B/L    x               TKE np                       Side step g x6 Green   6 laps  Blue 6 laps  x  x               Step ups: frwd, side 10" x30 10" step  X 30 ea  10" step  X 30 ea 8#db1x30  x               Squats - single 3x10 3x10  3x10  8#  x                                         Ham curls apollo 3 plates 7A85 3 plates  0N00  3 plates  8N90  x  x               Leg press single 3 pl 3x10 3 plates  9V64  3 plates  2T61  x  x               Wall slides hold 10 sec 2x10 2x10  2x10  x  x               stepper 5' 5 min  Speed 30  5 min  Speed 30  x  x               biodex balance 4' Catch Game x 4min  Catch Game x 4min    x               Dead lifts nv      30  x               Prone quad stretch 30" x3 30" x 3  30" x 3  x  x               lunges    nv 1x15           Assessment: Added lunges bilaterally x 15 repetitions with complaints of fatigue  Plan: Continue treatment as per PT plan of care

## 2018-03-26 ENCOUNTER — OFFICE VISIT (OUTPATIENT)
Dept: PHYSICAL THERAPY | Facility: CLINIC | Age: 23
End: 2018-03-26
Payer: COMMERCIAL

## 2018-03-26 DIAGNOSIS — Z98.890 S/P ACL RECONSTRUCTION: Primary | ICD-10-CM

## 2018-03-26 PROCEDURE — 97112 NEUROMUSCULAR REEDUCATION: CPT

## 2018-03-26 PROCEDURE — 97110 THERAPEUTIC EXERCISES: CPT

## 2018-03-26 PROCEDURE — 97530 THERAPEUTIC ACTIVITIES: CPT

## 2018-03-26 NOTE — PROGRESS NOTES
Daily Note     Today's date: 3/26/2018  Patient name: Fern Boogie  : 1995  MRN: [id number]  Referring provider: Martin Desai MD  Dx:   No diagnosis found  Subjective: Patient reports she did a lot of walking over the weekend while shopping for a formal dress and reports bilateral lower extremities felt very fatigued - "especially my quads "    Objective: See treatment diary below  Exercise Diary    3/14  3/16  3/19  3/23  3/26             bike [place] L6 10' Random  L6 x 10 min  Random  L6 x 10 min  x  x  x             LAQ  3 plates 8U20 3 plates    3 plates    x  x  x              Ball toss     Blue foam 4# 3x30  x  x  x                                       Monster walk Green x6 Green   X 6  Blue x6  x  x  x             SLB                         Calf raises single 3x10 3x10  3x10  x  x  x             SLR standin ways Green to fatigue Green TB to fatigue   B/L  Blue B/L    x  x             TKE np                       Side step Green  6 laps Green   6 laps Blue 6 laps  x  x  x             Step ups: frwd, side 10" x30 10" step  X 30 ea  10" step  X 30 ea 8#db1x30  x  x             Squats - single 3x10 3x10  3x10  8#  x  x                                       Ham curls apollo 3 plates 7M48 3 plates  0W39  3 plates  5L23  x  x  x             Leg press single 3 pl 3x10 3 plates  6Q63  3 plates  1M55  x  x  x             Wall slides hold 10 sec 2x10 2x10  2x10  x  x  x             stepper 5' 5 min  Speed 30  5 min  Speed 30  x  x  x             biodex balance 4' Catch Game x 4min  Catch Game x 4min    x  x             Dead lifts nv      30  x  x             Prone quad stretch 30" x3 30" x 3  30" x 3  x  x  x             lunges    nv 1x15 x        Single leg sit to stand      6 lb ball  1x10          Assessment: Added single leg sit to stand transfers as noted with complaints of fatigue  Plan: Continue treatment as per PT plan of care

## 2018-03-28 ENCOUNTER — OFFICE VISIT (OUTPATIENT)
Dept: PHYSICAL THERAPY | Facility: CLINIC | Age: 23
End: 2018-03-28
Payer: COMMERCIAL

## 2018-03-28 DIAGNOSIS — Z98.890 S/P ACL RECONSTRUCTION: Primary | ICD-10-CM

## 2018-03-28 PROCEDURE — 97110 THERAPEUTIC EXERCISES: CPT | Performed by: PHYSICAL THERAPIST

## 2018-03-28 PROCEDURE — 97112 NEUROMUSCULAR REEDUCATION: CPT | Performed by: PHYSICAL THERAPIST

## 2018-03-28 PROCEDURE — 97530 THERAPEUTIC ACTIVITIES: CPT | Performed by: PHYSICAL THERAPIST

## 2018-03-28 NOTE — PROGRESS NOTES
Daily Note     Today's date: 3/28/2018  Patient name: Debbie Pradhan  : 1995  MRN: [id number]  Referring provider: Yris Landrum MD  Dx:   Encounter Diagnosis     ICD-10-CM    1  S/P ACL reconstruction [id number]                   Subjective: Patient reports she did a lot of walking over the weekend while shopping for a formal dress and reports bilateral lower extremities felt very fatigued - "especially my quads "    Objective: See treatment diary below  Exercise Diary    3/14  3/16  3/19  3/23  3/26  3/28           bike [place] L6 10' Random  L6 x 10 min  Random  L6 x 10 min  x  x  x  x           LAQ  3 plates 5H36 3 plates  /05  3 plates  5/  x  x  x  4 pl            Ball toss     Blue foam 4# 3x30  x  x  x  x                                     Monster walk Green x6 Green   X 6  Blue x6  x  x  x  x           SLB                         Calf raises single 3x10 3x10  3x10  x  x  x  x           SLR standin ways Green to fatigue Green TB to fatigue   B/L  Blue B/L    x  x  x           TKE np                       Side step Green  6 laps Green   6 laps Blue 6 laps  x  x  x  x           Step ups: frwd, side 10" x30 10" step  X 30 ea  10" step  X 30 ea 8#db1x30  x  x  x           Squats - single 3x10 3x10  3x10  8#  x  x  x                                     Ham curls [place]ollo 3 plates 0T35 3 plates  9M92  3 plates  4S77  x  x  x  x           Leg press single 3 pl 3x10 3 plates  1G30  3 plates  9F93  x  x  x  4 pl 2x10, 3 plates Y67           Wall slides hold 10 sec 2x10 2x10  2x10  x  x  x  x           stepper 5' 5 min  Speed 30  5 min  Speed 30  x  x  x  x           biodex balance 4' Catch Game x 4min  Catch Game x 4min    x  x  x           Dead lifts nv      30  x  x  x           Prone quad stretch 30" x3 30" x 3  30" x 3  x  x  x  x           lunges    nv 1x15 x x       Single leg sit to stand      6 lb ball  1x10 x         Assessment: Pt britt rx well, fatigued with ex    Plan: Continue treatment as per PT plan of care

## 2018-03-29 ENCOUNTER — OFFICE VISIT (OUTPATIENT)
Dept: PHYSICAL THERAPY | Facility: CLINIC | Age: 23
End: 2018-03-29
Payer: COMMERCIAL

## 2018-03-29 DIAGNOSIS — Z98.890 S/P ACL RECONSTRUCTION: Primary | ICD-10-CM

## 2018-03-29 PROCEDURE — 97110 THERAPEUTIC EXERCISES: CPT | Performed by: PHYSICAL THERAPIST

## 2018-03-29 NOTE — PROGRESS NOTES
Daily Note     Today's date: 3/29/2018  Patient name: Sundar Valladares  : 1995  MRN: [id number]  Referring provider: Elda Alpers, MD  Dx:   Encounter Diagnosis     ICD-10-CM    1  S/P ACL reconstruction [id number]                   Subjective: Patient reports she did a lot of walking over the weekend while shopping for a formal dress and reports bilateral lower extremities felt very fatigued - "especially my quads "    Objective: See treatment diary below      Exercise Diary    3/14  3/16  3/19  3/23  3/26  3/28  3/29         bike [place] L6 10' Random  L6 x 10 min  Random  L6 x 10 min  x  x  x  x  x         LAQ  3 plates 0U02 3 plates  3/47  3 plates  0/86  x  x  x  4 pl  x          Ball toss     Blue foam 4# 3x30  x  x  x  x  x                                   Monster walk Green x6 Green   X 6  Blue x6  x  x  x  x  x         SLB                         Calf raises single 3x10 3x10  3x10  x  x  x  x  x         SLR standin ways Green to fatigue Green TB to fatigue   B/L  Blue B/L    x  x  x  x         TKE np                       Side step Green  6 laps Green   6 laps Blue 6 laps  x  x  x  x  x         Step ups: frwd, side 10" x30 10" step  X 30 ea  10" step  X 30 ea 8#db1x30  x  x  x  x         Squats - single 3x10 3x10  3x10  8#  x  x  x  x                                   Ham curls [place]ollo 3 plates 1I51 3 plates  2D88  3 plates  8M83  x  x  x  x  4 pl 2x10, 3 pl x10         Leg press single 3 pl 3x10 3 plates  3N13  3 plates  2W72  x  x  x  4 pl 2x10, 3 plates E86  x         Wall slides hold 10 sec 2x10 2x10  2x10  x  x  x  x  2x10         stepper 5' 5 min  Speed 30  5 min  Speed 30  x  x  x  x  x         biodex balance 4' Catch Game x 4min  Catch Game x 4min    x  x  x  x         Dead lifts nv      30  x  x  x  x         Prone quad stretch 30" x3 30" x 3  30" x 3  x  x  x  x  x         lunges    nv 1x15 x x x      Single leg sit to stand      6 lb ball  1x10 x nv      elliptical        5 min Assessment: Pt britt rx well, fatigued with ex  Plan: Continue treatment as per PT plan of care

## 2018-04-02 ENCOUNTER — TRANSCRIBE ORDERS (OUTPATIENT)
Dept: OBGYN CLINIC | Facility: MEDICAL CENTER | Age: 23
End: 2018-04-02

## 2018-04-03 ENCOUNTER — OFFICE VISIT (OUTPATIENT)
Dept: PHYSICAL THERAPY | Facility: CLINIC | Age: 23
End: 2018-04-03
Payer: COMMERCIAL

## 2018-04-03 DIAGNOSIS — Z98.890 S/P ACL RECONSTRUCTION: Primary | ICD-10-CM

## 2018-04-03 PROCEDURE — 97530 THERAPEUTIC ACTIVITIES: CPT | Performed by: PHYSICAL THERAPIST

## 2018-04-03 PROCEDURE — 97110 THERAPEUTIC EXERCISES: CPT | Performed by: PHYSICAL THERAPIST

## 2018-04-03 NOTE — PROGRESS NOTES
Daily Note     Today's date: 4/3/2018  Patient name: Vanesa Mcdonald  : 1995  MRN: [id number]  Referring provider: Johanna Yoo MD  Dx:   Encounter Diagnosis     ICD-10-CM    1  S/P ACL reconstruction [id number]                   Subjective: Patient reports she did a lot of walking over the weekend while shopping for a formal dress and reports bilateral lower extremities felt very fatigued - "especially my quads "    Objective: See treatment diary below      Exercise Diary    3/14  3/16  3/19  3/23  3/26  3/28  3/29  4/3       bike [place] L6 10' Random  L6 x 10 min  Random  L6 x 10 min  x  x  x  x  x  x       LAQ  3 plates 2M91 3 plates    3 plates    x  x  x  4 pl  x  4 pl 2x10  3 pl x10        Ball toss     Blue foam 4# 3x30  x  x  x  x  x  x                                 Monster walk Green x6 Green   X 6  Blue x6  x  x  x  x  x  x       SLB                         Calf raises single 3x10 3x10  3x10  x  x  x  x  x  x       SLR standin ways Green to fatigue Green TB to fatigue   B/L  Blue B/L    x  x  x  x  x       TKE np                       Side step Green  6 laps Green   6 laps Blue 6 laps  x  x  x  x  x  x       Step ups: frwd, side 10" x30 10" step  X 30 ea  10" step  X 30 ea 8#db1x30  x  x  x  x  x       Squats - single 3x10 3x10  3x10  8#  x  x  x  x  x                                 Ham curls [place] 3 plates 0I38 3 plates  2H74  3 plates  9J67  x  x  x  x  4 pl 2x10, 3 pl x10  x4 pl 3x10       Leg press single 3 pl 3x10 3 plates  2J00  3 plates  2A96  x  x  x  4 pl 2x10, 3 plates I00  x  4 pl 3H28       Wall slides hold 10 sec 2x10 2x10  2x10  x  x  x  x  2x10  x       stepper 5' 5 min  Speed 30  5 min  Speed 30  x  x  x  x  x  10'       biodex balance 4' Catch Game x 4min  Catch Game x 4min    x  x  x  x         Dead lifts nv      30  x  x  x  x  x       Prone quad stretch 30" x3 30" x 3  30" x 3  x  x  x  x  x  x       lunges    nv 1x15 x x x x     Single leg sit to stand 6 lb ball  1x10 x nv      elliptical        5 min x       Assessment: Pt britt rx well, fatigued with ex  Needed vc with lunges to [facility]  Plan: Continue treatment as per PT plan of care

## 2018-04-04 ENCOUNTER — APPOINTMENT (OUTPATIENT)
Dept: PHYSICAL THERAPY | Facility: CLINIC | Age: 23
End: 2018-04-04
Payer: COMMERCIAL

## 2018-04-06 ENCOUNTER — OFFICE VISIT (OUTPATIENT)
Dept: PHYSICAL THERAPY | Facility: CLINIC | Age: 23
End: 2018-04-06
Payer: COMMERCIAL

## 2018-04-06 DIAGNOSIS — Z98.890 S/P ACL RECONSTRUCTION: Primary | ICD-10-CM

## 2018-04-06 PROCEDURE — 97112 NEUROMUSCULAR REEDUCATION: CPT

## 2018-04-06 PROCEDURE — 97110 THERAPEUTIC EXERCISES: CPT

## 2018-04-06 PROCEDURE — 97530 THERAPEUTIC ACTIVITIES: CPT

## 2018-04-06 NOTE — PROGRESS NOTES
Daily Note     Today's date: 2018  Patient name: Jaclyn Mondragon  : 1995  MRN: [id number]  Referring provider: Ashu Ma MD  Dx:   No diagnosis found  Subjective: Patient reports "sometimes I still get weird feelings in my knee" for example when she is walking on uneven ground  Objective: See treatment diary below    X=performed    Exercise Diary    3/14  3/16  3/19  3/23  3/26  3/28  3/29  4/3  4/6     bike [place] L6 10' Random  L6 x 10 min  Random  L6 x 10 min  x  x  x  x  x  x  10 min     LAQ  3 plates 4E60 3 plates  /  3 plates  /  x  x  x  4 pl  x  4 pl 2x10  3 pl x10  4 pl  3x10      Ball toss     Blue foam 4# 3x30  x  x  x  x  x  x  x                               Monster walk Green x6 Green   X 6  Blue x6  x  x  x  x  x  x  x     SLB                         Calf raises single 3x10 3x10  3x10  x  x  x  x  x  x  x     SLR standin ways Green to fatigue Green TB to fatigue   B/L  Blue B/L    x  x  x  x  x  x     TKE np                       Side step Green  6 laps Green   6 laps Blue 6 laps  x  x  x  x  x  x  x     Step ups: frwd, side 10" x30 10" step  X 30 ea  10" step  X 30 ea 8#db1x30  x  x  x  x  x  x     Squats - single 3x10 3x10  3x10  8#  x  x  x  x  x  x                               Ham curls [place] 3 plates 4T83 3 plates  3M35  3 plates  1W41  x  x  x  x  4 pl 2x10, 3 pl x10  x4 pl 3x10  x     Leg press single 3 pl 3x10 3 plates  1L85  3 plates  2L22  x  x  x  4 pl 2x10, 3 plates E06  x  4 pl 3E75  x     Wall slides hold 10 sec 2x10 2x10  2x10  x  x  x  x  2x10  x  x     stepper 5' 5 min  Speed 30  5 min  Speed 30  x  x  x  x  x  10'  x     biodex balance 4' Catch Game x 4min  Catch Game x 4min    x  x  x  x    x     Dead lifts nv      30  x  x  x  x  x  x     Prone quad stretch 30" x3 30" x 3  30" x 3  x  x  x  x  x  x  x     lunges    nv 1x15 x x x x x    Single leg sit to stand      6 lb ball  1x10 x nv  x    elliptical        5 min x 5 min Assessment: Therapeutic exercise program is tolerated well  Plan: Continue treatment as per PT plan of care

## 2018-04-09 ENCOUNTER — OFFICE VISIT (OUTPATIENT)
Dept: PHYSICAL THERAPY | Facility: CLINIC | Age: 23
End: 2018-04-09
Payer: COMMERCIAL

## 2018-04-09 DIAGNOSIS — M25.562 ACUTE PAIN OF LEFT KNEE: ICD-10-CM

## 2018-04-09 DIAGNOSIS — S83.512D RUPTURE OF ANTERIOR CRUCIATE LIGAMENT OF LEFT KNEE, SUBSEQUENT ENCOUNTER: ICD-10-CM

## 2018-04-09 DIAGNOSIS — Z98.890 S/P ACL RECONSTRUCTION: Primary | ICD-10-CM

## 2018-04-09 PROCEDURE — 97110 THERAPEUTIC EXERCISES: CPT | Performed by: PHYSICAL THERAPIST

## 2018-04-09 PROCEDURE — 97112 NEUROMUSCULAR REEDUCATION: CPT | Performed by: PHYSICAL THERAPIST

## 2018-04-09 NOTE — PROGRESS NOTES
Daily Note     Today's date: 2018  Patient name: Corie Carey  : 1995  MRN: [id number]  Referring provider: Emily Vegas MD  Dx:   Encounter Diagnosis     ICD-10-CM    1  S/P ACL reconstruction Z98 890    2  Acute pain of left knee M25 562    3  Rupture of anterior cruciate ligament of left knee, subsequent encounter S83 512D                   Subjective: Patient reports feeling ok    Objective: See treatment diary below    X=performed    Exercise Diary    3/14  3/16  3/19  3/23  3/26  3/28  3/29  4/3  4/6  4/9   bike [place] L6 10' Random  L6 x 10 min  Random  L6 x 10 min  x  x  x  x  x  x  10 min  10   LAQ  3 plates 7Z84 3 plates    3 plates    x  x  x  4 pl  x  4 pl 2x10  3 pl x10  4 pl  2x10, 3 pl x10,   4 pl 2x10, 3 pl x10, 2 pl x10    Ball toss     Blue foam 4# 3x30  x  x  x  x  x  x  x  x                             Monster walk Green x6 Green   X 6  Blue x6  x  x  x  x  x  x  x  x   SLB                         Calf raises single 3x10 3x10  3x10  x  x  x  x  x  x  x  x   SLR standin ways Green to fatigue Green TB to fatigue   B/L  Blue B/L    x  x  x  x  x  x  x   TKE np                       Side step Green  6 laps Green   6 laps Blue 6 laps  x  x  x  x  x  x  x  x   Step ups: frwd, side 10" x30 10" step  X 30 ea  10" step  X 30 ea 8#db1x30  x  x  x  x  x  x  x   Squats - single 3x10 3x10  3x10  8#  x  x  x  x  x  x  8#3x10                             Ham curls [place] 3 plates 7W73 3 plates  8O53  3 plates  9L50  x  x  x  x  4 pl 2x10, 3 pl x10  x4 pl 3x10  x  4 pl 2x10, 3 pl x10, 2 pl x10   Leg press single 3 pl 3x10 3 plates  9L21  3 plates  1F06  x  x  x  4 pl 2x10, 3 plates I48  x  4 pl 6P77  x  x   Wall slides hold 10 sec 2x10 2x10  2x10  x  x  x  x  2x10  x  x  x   stepper 5' 5 min  Speed 30  5 min  Speed 30  x  x  x  x  x  10'  x  10'   biodex balance 4' Catch Game x 4min  Catch Game x 4min    x  x  x  x    x  x   Dead lifts nv      30  x  x  x  x  x  x  8# dbls x30 Prone quad stretch 30" x3 30" x 3  30" x 3  x  x  x  x  x  x  x  x   lunges    nv 1x15 x x x x x x   Single leg sit to stand      6 lb ball  1x10 x nv  x x   Rapid step ups           30 ea   elliptical        5 min x 5 min 5'     Assessment: Therapeutic exercise program is tolerated well  Pt continues to have difficulty with LAQ due to weakness  Plan: Continue treatment as per PT plan of care

## 2018-04-11 ENCOUNTER — OFFICE VISIT (OUTPATIENT)
Dept: PHYSICAL THERAPY | Facility: CLINIC | Age: 23
End: 2018-04-11
Payer: COMMERCIAL

## 2018-04-11 DIAGNOSIS — M25.562 ACUTE PAIN OF LEFT KNEE: Primary | ICD-10-CM

## 2018-04-11 PROCEDURE — 97530 THERAPEUTIC ACTIVITIES: CPT | Performed by: PHYSICAL THERAPIST

## 2018-04-11 PROCEDURE — 97112 NEUROMUSCULAR REEDUCATION: CPT | Performed by: PHYSICAL THERAPIST

## 2018-04-11 PROCEDURE — 97110 THERAPEUTIC EXERCISES: CPT | Performed by: PHYSICAL THERAPIST

## 2018-04-11 NOTE — PROGRESS NOTES
Daily Note     Today's date: 2018  Patient name: Josie Guerra  : 1995  MRN: [id number]  Referring provider: Cloyce Castleman, MD  Dx:   No diagnosis found  Subjective: Patient reports mild soreness upon arrival to PT but denies pain    Objective: See treatment diary below  X=performed    Exercise Diary     bike 10'           10   LAQ             4 pl 2x10, 3 pl x10, 2 pl x10    Ball toss Black foam x30           x                   Monster walk Black tb fatigue           x   SLB               Calf raises single 30           x   SLR standin ways Blue tb x30           x   TKE               Side step Black tb to fatigue           x   Step ups: frwd, side 10"x30           x   Squats - single 8#x30           8#3x10                   Ham curls [place] 4plate B20           4 pl 2x10, 3 pl x10, 2 pl x10   Leg press single x           x   Wall slides hold 10 sec 40# x30           x   stepper 10'           10'   biodex balance 3'           x   Dead lifts 8#x30           8# dbls x30   Prone quad stretch 30"x4           x   lunges 30          x   Single leg sit to stand 20          x   Rapid step ups 30          30 ea   elliptical 5'          5'     Assessment: Progressed therex as listed, Mild discomfort reported with laq but this improves with reps  Continue to progress as britt nV    Plan: Continue treatment as per PT plan of care

## 2018-04-13 ENCOUNTER — OFFICE VISIT (OUTPATIENT)
Dept: PHYSICAL THERAPY | Facility: CLINIC | Age: 23
End: 2018-04-13
Payer: COMMERCIAL

## 2018-04-13 DIAGNOSIS — Z98.890 S/P ACL RECONSTRUCTION: Primary | ICD-10-CM

## 2018-04-13 PROCEDURE — 97112 NEUROMUSCULAR REEDUCATION: CPT

## 2018-04-13 PROCEDURE — 97110 THERAPEUTIC EXERCISES: CPT

## 2018-04-13 NOTE — PROGRESS NOTES
Daily Note     Today's date: 2018  Patient name: Fern Eugene  : 1995  MRN: [id number]  Referring provider: Palomo Rosen MD  Dx:   No diagnosis found  Subjective: Patient arrives to today's treatment complaining of fatigue adding that she currently has her period  Patient also describes feeling some discomfort and tightness in the left quadricep  Objective: See treatment diary below  X=performed    Exercise Diary              bike 10' 10'            LAQ   2x10 40#  1x10 30#  1x10 20#             Ball toss Black foam x30 Black foam  3x10                           Monster walk Black tb fatigue Black tb  To fatigue            SLB              Calf raises single 30             SLR standin ways Blue tb x30             TKE              Side step Black tb to fatigue Black tb  To fatigue            Step ups: frwd, side 10"x30             Squats - single 8#x30                            Ham curls apollo 4plate D14 3B24 20#  [id number] 30#  1x10 30#            Leg press single x 40#  3x10            Wall slides hold 10 sec 40# x30             stepper 10' 10'            biodex balance 3'             Dead lifts 8#x30             Prone quad stretch 30"x4 30"x4            lunges 30 3x10            Single leg sit to stand 20 2x10            Rapid step ups 30 4 inch  1x30            elliptical 5' 5'            Single leg bridge on bosu  2x10            Prone trunk twist on bosu  2x10            Oblique crunch on bosu  1x10              Assessment: Progressed therapeutic exercise program on the bosu ball as noted with complaints of fatigue  Plan: Continue treatment as per PT plan of care

## 2018-04-16 ENCOUNTER — OFFICE VISIT (OUTPATIENT)
Dept: PHYSICAL THERAPY | Facility: CLINIC | Age: 23
End: 2018-04-16
Payer: COMMERCIAL

## 2018-04-16 DIAGNOSIS — S83.512D RUPTURE OF ANTERIOR CRUCIATE LIGAMENT OF LEFT KNEE, SUBSEQUENT ENCOUNTER: ICD-10-CM

## 2018-04-16 DIAGNOSIS — Z98.890 S/P ACL RECONSTRUCTION: Primary | ICD-10-CM

## 2018-04-16 DIAGNOSIS — M25.562 ACUTE PAIN OF LEFT KNEE: ICD-10-CM

## 2018-04-16 PROCEDURE — G8979 MOBILITY GOAL STATUS: HCPCS | Performed by: PHYSICAL THERAPIST

## 2018-04-16 PROCEDURE — 97110 THERAPEUTIC EXERCISES: CPT | Performed by: PHYSICAL THERAPIST

## 2018-04-16 PROCEDURE — G8978 MOBILITY CURRENT STATUS: HCPCS | Performed by: PHYSICAL THERAPIST

## 2018-04-16 NOTE — PROGRESS NOTES
Daily Note     Today's date: 2018  Patient name: Jaclyn Mondragon  : 1995  MRN: [id number]  Referring provider: Ashu Ma MD  Dx:   Encounter Diagnosis     ICD-10-CM    1  S/P ACL reconstruction Z98 890    2  Acute pain of left knee M25 562    3  Rupture of anterior cruciate ligament of left knee, subsequent encounter S83 159D                   Subjective: Patient reports feeling tightness today  Objective: See treatment diary below  Patellar mobs and PROM performed  X=performed    Exercise Diary             bike 10' 10' x           LAQ   2x10 40#  1x10 30#  1x10 20# x            Ball toss Black foam x30 Black foam  3x10 x                          Monster walk Black tb fatigue Black tb  To fatigue x           SLB              Calf raises single 30  8# dbls x30           SLR standin ways Blue tb x30  x           TKE              Side step Black tb to fatigue Black tb  To fatigue x           Step ups: frwd, side 10"x30  8# dbls x30           Squats - single 8#x30  x                          Ham curls apollo 4plate C05 7R07 43#  [id number] 30#  1x10 30# x           Leg press single x 40#  3x10 x           Wall slides hold 10 sec 40# x30  x           stepper 10' 10' x           biodex balance 3'             Dead lifts 8#x30  x           Prone quad stretch 30"x4 30"x4 x           lunges 30 3x10 x           Single leg sit to stand 20 2x10 x           Rapid step ups 30 4 inch  1x30 6" 30 ea           elliptical 5' 5' 5'           Single leg bridge on bosu  2x10 nv           Prone trunk twist on bosu  2x10 nv           Oblique crunch on bosu  1x10 nv             Assessment: britt rx well, fatigued with ex  Plan: Continue treatment as per PT plan of care

## 2018-04-18 ENCOUNTER — OFFICE VISIT (OUTPATIENT)
Dept: PHYSICAL THERAPY | Facility: CLINIC | Age: 23
End: 2018-04-18
Payer: COMMERCIAL

## 2018-04-18 DIAGNOSIS — Z98.890 S/P ACL RECONSTRUCTION: Primary | ICD-10-CM

## 2018-04-18 PROCEDURE — 97110 THERAPEUTIC EXERCISES: CPT

## 2018-04-18 PROCEDURE — 97112 NEUROMUSCULAR REEDUCATION: CPT

## 2018-04-18 NOTE — PROGRESS NOTES
Daily Note     Today's date: 2018  Patient name: Marcelino Fu  : 1995  MRN: [id number]  Referring provider: Hernando Monroe MD  Dx:   No diagnosis found  Subjective: Patient complains of mild discomfort and tightness in the left knee today  Patient reports she is scheduled to see the surgeon on 18  Objective: See treatment diary below  Also performed left knee PROM and manual hamstring stretching  X=performed    Exercise Diary           bike 10' 10' x          LAQ   2x10 40#  1x10 30#  1x10 20# x x          Ball toss Black foam x30 Black foam  3x10 x                        Monster walk Black tb fatigue Black tb  To fatigue x          SLB             Calf raises single 30  8# dbls x30          SLR standin ways Blue tb x30  x          TKE             Side step Black tb to fatigue Black tb  To fatigue x          Step ups: frwd, side 10"x30  8# dbls x30          Squats - single 8#x30  x                        Ham curls apollo 4plate Q11 5V68 13#  8R25 30#  1x10 30# x x         Leg press single x 40#  3x10 x x         Wall slides hold 10 sec 40# x30  x          stepper 10' 10' x 10'         biodex balance 3'            Dead lifts 8#x30  x          Prone quad stretch 30"x4 30"x4 x x         lunges 30 3x10 x          Single leg sit to stand 20 2x10 x          Rapid step ups 30 4 inch  1x30 6" 30 ea 6" 30 ea         elliptical 5' 5' 5' 8'         Single leg bridge on bosu  2x10 nv 2x10         Prone trunk twist on bosu  2x10 nv 2x10         Oblique crunch on bosu  1x10 nv 2x10         Israeli split squat    1x5  1x8         Seated hip flex/ and abd rapid    2x20           Assessment: Fatigue reported with therapeutic exercise program     Plan: Continue treatment as per PT plan of care

## 2018-04-20 ENCOUNTER — OFFICE VISIT (OUTPATIENT)
Dept: PHYSICAL THERAPY | Facility: CLINIC | Age: 23
End: 2018-04-20
Payer: COMMERCIAL

## 2018-04-20 DIAGNOSIS — Z98.890 S/P ACL RECONSTRUCTION: Primary | ICD-10-CM

## 2018-04-20 PROCEDURE — 97110 THERAPEUTIC EXERCISES: CPT

## 2018-04-20 PROCEDURE — 97112 NEUROMUSCULAR REEDUCATION: CPT

## 2018-04-20 NOTE — PROGRESS NOTES
Daily Note     Today's date: 2018  Patient name: Nishi Kemp  : 1995  MRN: [id number]  Referring provider: Ericka Azevedo MD  Dx:   No diagnosis found  Subjective: Patient complains the left knee "still feels tight sometimes" adding that she has been stretching more often at home  Objective: See treatment diary below  X=performed    Exercise Diary          bike 10' 10' x  10'        LAQ   2x10 40#  1x10 30#  1x10 20# x x x         Ball toss Black foam x30 Black foam  3x10 x  x                      Monster walk Black tb fatigue Black tb  To fatigue x  x        SLB             Calf raises single 30  8# dbls x30  x        SLR standin ways Blue tb x30  x  x        TKE             Side step Black tb to fatigue Black tb  To fatigue x  x        Step ups: frwd, side 10"x30  8# dbls x30  x        Squats - single 8#x30  x                        Ham curls apollo 4plate D15 8G80 49#  6V83 30#  1x10 30# x x x        Leg press single x 40#  3x10 x x x        Wall slides hold 10 sec 40# x30  x          stepper 10' 10' x 10'         biodex balance 3'            Dead lifts 8#x30  x          Prone quad stretch 30"x4 30"x4 x x x        lunges 30 3x10 x          Single leg sit to stand 20 2x10 x          Rapid step ups 30 4 inch  1x30 6" 30 ea 6" 30 ea         elliptical 5' 5' 5' 8' 10'        Single leg bridge on bosu  2x10 nv 2x10         Prone trunk twist on bosu  2x10 nv 2x10         Oblique crunch on bosu  1x10 nv 2x10         Andorran split squat    1x5  1x8         Seated hip flex/ and abd rapid    2x20 2x20          Assessment: Fatigue reported with therapeutic exercise program     Plan: Continue treatment as per PT plan of care

## 2018-04-23 ENCOUNTER — OFFICE VISIT (OUTPATIENT)
Dept: PHYSICAL THERAPY | Facility: CLINIC | Age: 23
End: 2018-04-23
Payer: COMMERCIAL

## 2018-04-23 DIAGNOSIS — Z98.890 S/P ACL RECONSTRUCTION: Primary | ICD-10-CM

## 2018-04-23 PROCEDURE — 97110 THERAPEUTIC EXERCISES: CPT | Performed by: PHYSICAL THERAPIST

## 2018-04-23 PROCEDURE — 97140 MANUAL THERAPY 1/> REGIONS: CPT | Performed by: PHYSICAL THERAPIST

## 2018-04-23 NOTE — PROGRESS NOTES
Daily Note     Today's date: 2018  Patient name: Jaimie Echeverria  : 1995  MRN: [id number]  Referring provider: Pat Schroeder MD  Dx:   Encounter Diagnosis     ICD-10-CM    1  S/P ACL reconstruction [id number]                   Subjective: Patient reports calf tightness L  Objective: See treatment diary below  GT to L calf  X=performed    Exercise Diary         bike 10' 10' x  10' 10'       LAQ   2x10 40#  1x10 30#  1x10 20# x x x x        Ball toss Black foam x30 Black foam  3x10 x  x x                     Monster walk Black tb fatigue Black tb  To fatigue x  x        SLB             Calf raises single 30  8# dbls x30  x x       SLR standin ways Blue tb x30  x  x x       TKE             Side step Black tb to fatigue Black tb  To fatigue x  x x       Step ups: frwd, side 10"x30  8# dbls x30  x x       Squats - single 8#x30  x   x                     Ham curls apollo 4plate Q96 3M48 04#  0T54 30#  1x10 30# x x x x       Leg press single x 40#  3x10 x x x x       Wall slides hold 10 sec 40# x30  x   x       stepper 10' 10' x 10'  x       biodex balance 3'     nv       Dead lifts 8#x30  x   x       Prone quad stretch 30"x4 30"x4 x x x x       lunges 30 3x10 x   x       Single leg sit to stand 20 2x10 x   x       Rapid step ups 30 4 inch  1x30 6" 30 ea 6" 30 ea  x       elliptical 5' 5' 5' 8' 10' 5'       Single leg bridge on bosu  2x10 nv 2x10  nv       Prone trunk twist on bosu  2x10 nv 2x10  nv       Oblique crunch on bosu  1x10 nv 2x10  nv       Telugu split squat    1x5  1x8  nv       Seated hip flex/ and abd rapid    2x20 2x20 x       SLB with trunk rotation      green x20       Lunge with rot      green x20                      Assessment: Fatigue reported with therapeutic exercise program     Plan: Continue treatment as per PT plan of care

## 2018-04-25 ENCOUNTER — OFFICE VISIT (OUTPATIENT)
Dept: PHYSICAL THERAPY | Facility: CLINIC | Age: 23
End: 2018-04-25
Payer: COMMERCIAL

## 2018-04-25 DIAGNOSIS — Z98.890 S/P ACL RECONSTRUCTION: Primary | ICD-10-CM

## 2018-04-25 PROCEDURE — 97110 THERAPEUTIC EXERCISES: CPT

## 2018-04-25 PROCEDURE — 97112 NEUROMUSCULAR REEDUCATION: CPT

## 2018-04-25 NOTE — PROGRESS NOTES
Daily Note     Today's date: 2018  Patient name: Bridget Palomo  : 1995  MRN: [id number]  Referring provider: Sonia Lunsford MD  Dx:   Encounter Diagnosis     ICD-10-CM    1  S/P ACL reconstruction [id number]                   Subjective: Patient reports the left calf feels better today  Patient is scheduled to return to the surgeon on 18  Objective: See treatment diary below  X=performed    Exercise Diary        bike 10' 10' x  10' 10' 10'      LAQ   2x10 40#  1x10 30#  1x10 20# x x x x x       Ball toss Black foam x30 Black foam  3x10 x  x x x                    Monster walk Black tb fatigue Black tb  To fatigue x  x  x      SLB             Calf raises single 30  8# dbls x30  x x       SLR standin ways Blue tb x30  x  x x       TKE             Side step Black tb to fatigue Black tb  To fatigue x  x x x      Step ups: frwd, side 10"x30  8# dbls x30  x x x      Squats - single 8#x30  x   x                     Ham curls apollo 4plate S10 8T97 14#  8Q14 30#  1x10 30# x x x x x      Leg press single x 40#  3x10 x x x x x      Wall slides hold 10 sec 40# x30  x   x       stepper 10' 10' x 10'  x 10'      biodex balance 3'     nv       Dead lifts 8#x30  x   x       Prone quad stretch 30"x4 30"x4 x x x x x      lunges 30 3x10 x   x x      Single leg sit to stand 20 2x10 x   x x      Rapid step ups 30 4 inch  1x30 6" 30 ea 6" 30 ea  x x      elliptical 5' 5' 5' 8' 10' 5' 5'      Single leg bridge on bosu  2x10 nv 2x10  nv 2x10      Prone trunk twist on bosu  2x10 nv 2x10  nv 2x10      Oblique crunch on bosu  1x10 nv 2x10  nv 2x10      [place] split squat    1x5  1x8  nv x      Seated hip flex/ and abd rapid    2x20 2x20 x x      SLB with trunk rotation      green x20       Lunge with rot      green x20                      Assessment: Fatigue reported with therapeutic exercise program     Plan: Continue treatment as per PT plan of care

## 2018-04-27 ENCOUNTER — OFFICE VISIT (OUTPATIENT)
Dept: PHYSICAL THERAPY | Facility: CLINIC | Age: 23
End: 2018-04-27
Payer: COMMERCIAL

## 2018-04-27 DIAGNOSIS — Z98.890 S/P ACL RECONSTRUCTION: Primary | ICD-10-CM

## 2018-04-27 PROCEDURE — 97110 THERAPEUTIC EXERCISES: CPT

## 2018-04-27 PROCEDURE — 97112 NEUROMUSCULAR REEDUCATION: CPT

## 2018-04-27 NOTE — PROGRESS NOTES
Daily Note     Today's date: 2018  Patient name: Katie Law  : 1995  MRN: [id number]  Referring provider: Claudy High MD  Dx:   Encounter Diagnosis     ICD-10-CM    1  S/P ACL reconstruction [id number]                 Subjective: No significant changes to report since the last visit  Patient is scheduled to return to the surgeon on 18  Objective: See treatment diary below     X=performed    Exercise Diary       bike 10' 10' x  10' 10' 10'      LAQ   2x10 40#  1x10 30#  1x10 20# x x x x x x      Ball toss Black foam x30 Black foam  3x10 x  x x x x                   Monster walk Black tb fatigue Black tb  To fatigue x  x  x      SLB             Calf raises single 30  8# dbls x30  x x  x     SLR standin ways Blue tb x30  x  x x  x     TKE             Side step Black tb to fatigue Black tb  To fatigue x  x x x      Step ups: frwd, side 10"x30  8# dbls x30  x x x      Squats - single 8#x30  x   x  x                   Ham curls apollo 4plate J09 6V68 60#  5C76 30#  1x10 30# x x x x x x     Leg press single x 40#  3x10 x x x x x x     Wall slides hold 10 sec 40# x30  x   x       stepper 10' 10' x 10'  x 10' x     biodex balance 3'     nv       Dead lifts 8#x30  x   x  x     Prone quad stretch 30"x4 30"x4 x x x x x x     lunges 30 3x10 x   x x forward  and lateral  2x10     Single leg sit to stand 20 2x10 x   x x x     Rapid step ups 30 4 inch  1x30 6" 30 ea 6" 30 ea  x x x     elliptical 5' 5' 5' 8' 10' 5' 5'      Single leg bridge on bosu  2x10 nv 2x10  nv 2x10      Prone trunk twist on bosu  2x10 nv 2x10  nv 2x10      Oblique crunch on bosu  1x10 nv 2x10  nv 2x10      Lao split squat    1x5  1x8  nv x x     Seated hip flex/ and abd rapid    2x20 2x20 x x x     SLB with trunk rotation      green x20  Green  2x10     Lunge with rot      green x20       Prone pball abs        2x10       Assessment: Additional lateral lunges are performed with complaints of fatigue  Plan: Continue treatment as per PT plan of care

## 2018-04-30 ENCOUNTER — OFFICE VISIT (OUTPATIENT)
Dept: PHYSICAL THERAPY | Facility: CLINIC | Age: 23
End: 2018-04-30
Payer: COMMERCIAL

## 2018-04-30 DIAGNOSIS — Z98.890 S/P ACL RECONSTRUCTION: Primary | ICD-10-CM

## 2018-04-30 PROCEDURE — 97112 NEUROMUSCULAR REEDUCATION: CPT

## 2018-04-30 PROCEDURE — 97110 THERAPEUTIC EXERCISES: CPT

## 2018-04-30 NOTE — PROGRESS NOTES
Daily Note     Today's date: 2018  Patient name: Josie Guerra  : 1995  MRN: [id number]  Referring provider: Cloyce Castleman, MD  Dx:   Encounter Diagnosis     ICD-10-CM    1  S/P ACL reconstruction [id number]                 Subjective: Patient arrives to today's treatment complaining of tightness in the left quadriceps  Patient is scheduled to return to the surgeon on 18  Objective: See treatment diary below     X=performed    Exercise Diary      bike 10' 10' x  10' 10' 10'  x    LAQ   2x10 40#  1x10 30#  1x10 20# x x x x x x x     Ball toss Black foam x30 Black foam  3x10 x  x x x x x                  Monster walk Black tb fatigue Black tb  To fatigue x  x  x  x    SLB             Calf raises single 30  8# dbls x30  x x  x x    SLR standin ways Blue tb x30  x  x x  x x    TKE             Side step Black tb to fatigue Black tb  To fatigue x  x x x  x    Step ups: frwd, side 10"x30  8# dbls x30  x x x      Squats - single 8#x30  x   x  x x                  Ham curls apollo 4plate W85 1V41 58#  5J53 30#  1x10 30# x x x x x x x    Leg press single x 40#  3x10 x x x x x x x    Wall slides hold 10 sec 40# x30  x   x       stepper 10' 10' x 10'  x 10' x x    biodex balance 3'     nv       Dead lifts 8#x30  x   x  x x    Prone quad stretch 30"x4 30"x4 x x x x x x x    lunges 30 3x10 x   x x forward  and lateral  2x10 x    Single leg sit to stand 20 2x10 x   x x x x    Rapid step ups 30 4 inch  1x30 6" 30 ea 6" 30 ea  x x x x    elliptical 5' 5' 5' 8' 10' 5' 5'  x    Single leg bridge on bosu  2x10 nv 2x10  nv 2x10      Prone trunk twist on bosu  2x10 nv 2x10  nv 2x10      Oblique crunch on bosu  1x10 nv 2x10  nv 2x10      Romanian split squat    1x5  1x8  nv x x x    Seated hip flex/ and abd rapid    2x20 2x20 x x x x    SLB with trunk rotation      green x20  Green  2x10 x    Lunge with rot      green x20       Prone pball abs        2x10 Assessment: Therapeutic exercise program is tolerated well  Plan: Continue treatment as per PT plan of care

## 2018-05-02 ENCOUNTER — OFFICE VISIT (OUTPATIENT)
Dept: PHYSICAL THERAPY | Facility: CLINIC | Age: 23
End: 2018-05-02
Payer: COMMERCIAL

## 2018-05-02 DIAGNOSIS — Z98.890 S/P ACL RECONSTRUCTION: Primary | ICD-10-CM

## 2018-05-02 PROCEDURE — 97110 THERAPEUTIC EXERCISES: CPT

## 2018-05-02 PROCEDURE — 97112 NEUROMUSCULAR REEDUCATION: CPT

## 2018-05-02 NOTE — PROGRESS NOTES
Daily Note     Today's date: 2018  Patient name: Jb Phan  : 1995  MRN: [id number]  Referring provider: Eleno Ponce MD  Dx:   Encounter Diagnosis     ICD-10-CM    1  S/P ACL reconstruction [id number]                 Subjective: Patient reports she has been cleared by her surgeon to begin running  Objective: See treatment diary below     X=performed    Exercise Diary     bike 10' 10' x  10' 10' 10'  x    LAQ   2x10 40#  1x10 30#  1x10 20# x x x x x x x     Ball toss Black foam x30 Black foam  3x10 x  x x x x x Black foam  3x10                 Monster walk Black tb fatigue Black tb  To fatigue x  x  x  x    SLB             Calf raises single 30  8# dbls x30  x x  x x    SLR standin ways Blue tb x30  x  x x  x x    TKE             Side step Black tb to fatigue Black tb  To fatigue x  x x x  x Black tb  30ft x4   Step ups: frwd, side 10"x30  8# dbls x30  x x x      Squats - single 8#x30  x   x  x x                  Ham curls apollo 4plate D41 7L72 13#  8C82 30#  1x10 30# x x x x x x x    Leg press single x 40#  3x10 x x x x x x x Single leg squat  40# 3x10   Wall slides hold 10 sec 40# x30  x   x    30"x3   stepper 10' 10' x 10'  x 10' x x 10'   biodex balance 3'     nv       Dead lifts 8#x30  x   x  x x    Prone quad stretch 30"x4 30"x4 x x x x x x x 30"x4   lunges 30 3x10 x   x x forward  and lateral  2x10 x    Single leg sit to stand 20 2x10 x   x x x x    Rapid step ups 30 4 inch  1x30 6" 30 ea 6" 30 ea  x x x x    elliptical 5' 5' 5' 8' 10' 5' 5'  x 10'   Single leg bridge on bosu  2x10 nv 2x10  nv 2x10   2x10   Prone trunk twist on bosu  2x10 nv 2x10  nv 2x10   2x10   Oblique crunch on bosu  1x10 nv 2x10  nv 2x10   2x10   Divehi split squat    1x5  1x8  nv x x x 2x8   Seated hip flex/ and abd rapid    2x20 2x20 x x x x 2x20   SLB with trunk rotation      green x20  Green  2x10 x    Lunge with rot      green x20       Prone pball abs        2x10  2x10   Treadmill jogging          1' intervals       Assessment: No left knee pain reported when jogging today  Fatigue reported post treatment  Plan: Continue treatment as per PT plan of care

## 2018-05-04 ENCOUNTER — OFFICE VISIT (OUTPATIENT)
Dept: PHYSICAL THERAPY | Facility: CLINIC | Age: 23
End: 2018-05-04
Payer: COMMERCIAL

## 2018-05-04 DIAGNOSIS — Z98.890 S/P ACL RECONSTRUCTION: Primary | ICD-10-CM

## 2018-05-04 PROCEDURE — 97112 NEUROMUSCULAR REEDUCATION: CPT

## 2018-05-04 PROCEDURE — G8992 OTHER PT/OT  D/C STATUS: HCPCS

## 2018-05-04 PROCEDURE — 97110 THERAPEUTIC EXERCISES: CPT

## 2018-05-04 PROCEDURE — G8991 OTHER PT/OT GOAL STATUS: HCPCS

## 2018-05-04 NOTE — PROGRESS NOTES
Daily Note     Today's date: 2018  Patient name: Kirti Cee  : 1995  MRN: [id number]  Referring provider: Brenton Severance, MD  Dx:   Encounter Diagnosis     ICD-10-CM    1  S/P ACL reconstruction [id number]                 Subjective: No significant changes to report since the last visit  Objective: See treatment diary below     X=performed     Exercise Diary     bike 10' 10' x  10' 10' 10'  x    LAQ   2x10 40#  1x10 30#  1x10 20# x x x x x x x     Ball toss Black foam x30 Black foam  3x10 x  x x x x x Black foam  3x10                 Monster walk Black tb fatigue Black tb  To fatigue x  x  x  x    SLB             Calf raises single 30  8# dbls x30  x x  x x    SLR standin ways Blue tb x30  x  x x  x x    TKE             Side step Black tb to fatigue Black tb  To fatigue x  x x x  x Black tb  30ft x4   Step ups: frwd, side 10"x30  8# dbls x30  x x x      Squats - single 8#x30  x   x  x x                  Ham curls apollo 4plate P61 5V95 41#  3P92 30#  1x10 30# x x x x x x x    Leg press single x 40#  3x10 x x x x x x x Single leg squat  40# 3x10   Wall slides hold 10 sec 40# x30  x   x    30"x3   stepper 10' 10' x 10'  x 10' x x 10'   biodex balance 3'     nv       Dead lifts 8#x30  x   x  x x    Prone quad stretch 30"x4 30"x4 x x x x x x x 30"x4   lunges 30 3x10 x   x x forward  and lateral  2x10 x    Single leg sit to stand 20 2x10 x   x x x x    Rapid step ups 30 4 inch  1x30 6" 30 ea 6" 30 ea  x x x x    elliptical 5' 5' 5' 8' 10' 5' 5'  x 10'   Single leg bridge on bosu  2x10 nv 2x10  nv 2x10   2x10   Prone trunk twist on bosu  2x10 nv 2x10  nv 2x10   2x10   Oblique crunch on bosu  1x10 nv 2x10  nv 2x10   2x10   Swedish split squat    1x5  1x8  nv x x x 2x8   Seated hip flex/ and abd rapid    2x20 2x20 x x x x 2x20   SLB with trunk rotation      green x20  Green  2x10 x    Lunge with rot      green x20       Prone pball abs 2x10  2x10   Treadmill jogging          1' intervals     Hip flexor stretch          30"x3     Assessment: Patient is doing well with therapeutic exercise program     Plan: Discharge patient from skilled PT - she will continue at a clinic closer to home

## 2018-05-11 ENCOUNTER — OFFICE VISIT (OUTPATIENT)
Dept: FAMILY MEDICINE CLINIC | Facility: CLINIC | Age: 23
End: 2018-05-11
Payer: COMMERCIAL

## 2018-05-11 ENCOUNTER — TELEPHONE (OUTPATIENT)
Dept: FAMILY MEDICINE CLINIC | Facility: CLINIC | Age: 23
End: 2018-05-11

## 2018-05-11 VITALS
SYSTOLIC BLOOD PRESSURE: 110 MMHG | TEMPERATURE: 97.9 F | DIASTOLIC BLOOD PRESSURE: 70 MMHG | WEIGHT: 137 LBS | BODY MASS INDEX: 21.5 KG/M2 | HEIGHT: 67 IN | OXYGEN SATURATION: 98 % | HEART RATE: 98 BPM

## 2018-05-11 DIAGNOSIS — J02.9 ACUTE PHARYNGITIS, UNSPECIFIED ETIOLOGY: Primary | ICD-10-CM

## 2018-05-11 DIAGNOSIS — J02.9 ACUTE PHARYNGITIS, UNSPECIFIED ETIOLOGY: ICD-10-CM

## 2018-05-11 DIAGNOSIS — J02.9 PHARYNGITIS, UNSPECIFIED ETIOLOGY: Primary | ICD-10-CM

## 2018-05-11 LAB — S PYO AG THROAT QL: NEGATIVE

## 2018-05-11 PROCEDURE — 87070 CULTURE OTHR SPECIMN AEROBIC: CPT | Performed by: PHYSICIAN ASSISTANT

## 2018-05-11 PROCEDURE — 99213 OFFICE O/P EST LOW 20 MIN: CPT | Performed by: PHYSICIAN ASSISTANT

## 2018-05-11 PROCEDURE — 3008F BODY MASS INDEX DOCD: CPT | Performed by: PHYSICIAN ASSISTANT

## 2018-05-11 RX ORDER — NORETHINDRONE ACETATE AND ETHINYL ESTRADIOL AND FERROUS FUMARATE 1MG-20(24)
1 KIT ORAL DAILY
COMMUNITY
Start: 2016-07-31 | End: 2018-09-12 | Stop reason: SDUPTHER

## 2018-05-11 RX ORDER — BUDESONIDE AND FORMOTEROL FUMARATE DIHYDRATE 160; 4.5 UG/1; UG/1
2 AEROSOL RESPIRATORY (INHALATION) 2 TIMES DAILY
COMMUNITY
End: 2019-06-07 | Stop reason: ALTCHOICE

## 2018-05-11 RX ORDER — OLOPATADINE HYDROCHLORIDE 7 MG/ML
SOLUTION OPHTHALMIC
Refills: 0 | COMMUNITY
Start: 2018-03-10 | End: 2018-10-05 | Stop reason: ALTCHOICE

## 2018-05-11 RX ORDER — ADAPALENE AND BENZOYL PEROXIDE 3; 25 MG/G; MG/G
GEL TOPICAL
COMMUNITY
End: 2020-01-07 | Stop reason: ALTCHOICE

## 2018-05-11 RX ORDER — AMOXICILLIN 500 MG/1
500 CAPSULE ORAL EVERY 8 HOURS SCHEDULED
Qty: 30 CAPSULE | Refills: 0 | Status: SHIPPED | OUTPATIENT
Start: 2018-05-11 | End: 2018-05-21

## 2018-05-11 RX ORDER — OXYCODONE HYDROCHLORIDE AND ACETAMINOPHEN 5; 325 MG/1; MG/1
TABLET ORAL
COMMUNITY
End: 2018-10-05 | Stop reason: ALTCHOICE

## 2018-05-11 NOTE — TELEPHONE ENCOUNTER
Pt mom called and wants to know if you will send in the mouth wash for her  [phone]  [facility]

## 2018-05-11 NOTE — TELEPHONE ENCOUNTER
PTS MOM CALLED AND SAID the magic mouthwash isnt covered because all of the ingredients are available OTC - mom said she would just like an antibiotic - [address] [facility] MOM BACK [phone]

## 2018-05-11 NOTE — PROGRESS NOTES
Assessment/Plan:       Diagnoses and all orders for this visit:    Pharyngitis, unspecified etiology    Acute pharyngitis, unspecified etiology  -     POCT rapid strep A  -     Throat culture; Future  -     Throat culture    Other orders  -     budesonide-formoterol (SYMBICORT) 160-4 5 mcg/act inhaler; Inhale 2 puffs 2 (two) times a day            Subjective:      Patient ID: Alba Leader is a 26 y o  female  Sore Throat    This is a new problem  The current episode started in the past 7 days  The problem has been unchanged  The maximum temperature recorded prior to her arrival was 101 - 101 9 F  The fever has been present for 1 to 2 days  The pain is at a severity of 8/10  The pain is severe  Associated symptoms include coughing, ear pain and headaches  Pertinent negatives include no ear discharge or shortness of breath  She has tried acetaminophen for the symptoms  The treatment provided no relief  Fever   This is a new problem  The problem occurs 2 to 4 times per day  The problem has been resolved  Associated symptoms include chest pain, chills, coughing, diaphoresis, a fever, headaches and a sore throat  Nothing aggravates the symptoms  She has tried acetaminophen for the symptoms  The treatment provided significant relief  Earache    There is pain in both ears  This is a new problem  The current episode started in the past 7 days  The problem has been unchanged  The maximum temperature recorded prior to her arrival was 101 - 101 9 F  The fever has been present for 1 to 2 days  The pain is at a severity of 5/10  The pain is moderate  Associated symptoms include coughing, headaches and a sore throat  Pertinent negatives include no ear discharge  She has tried acetaminophen for the symptoms  The treatment provided mild relief  Cough   This is a new problem  The current episode started in the past 7 days  The problem has been unchanged  The problem occurs every few minutes   The cough is non-productive  Associated symptoms include chest pain, chills, ear congestion, ear pain, a fever, headaches, nasal congestion and a sore throat  Pertinent negatives include no shortness of breath  Nothing aggravates the symptoms  She has tried OTC cough suppressant for the symptoms  The treatment provided mild relief  Her past medical history is significant for asthma  The following portions of the patient's history were reviewed and updated as appropriate:   She has no past medical history on file  ,   does not have any pertinent problems on file  ,   has no past surgical history on file  ,  family history is not on file  ,   reports that she has never smoked  She has never used smokeless tobacco  She reports that she does not drink alcohol or use drugs  ,  has No Known Allergies     Current Outpatient Prescriptions   Medication Sig Dispense Refill    Adapalene-Benzoyl Peroxide (EPIDUO FORTE) 0 3-2 5 % GEL Epiduo Forte 0 3 %-2 5 % topical gel with pump      budesonide-formoterol (SYMBICORT) 160-4 5 mcg/act inhaler Inhale 2 puffs 2 (two) times a day      norethindrone-ethinyl estradiol-ferrous fumarate (JUNEL FE 24) 1-20 MG-MCG(24) per tablet Take 1 tablet by mouth daily      PAZEO 0 7 % SOLN instill 1 drop into both eyes once daily  0    ranitidine (ZANTAC) 300 MG tablet take 1 tablet by mouth twice a day 60 tablet 0    Norethin-Eth Estrad-Fe Biphas (LO LOESTRIN FE) 1 MG-10 MCG / 10 MCG TABS Lo Loestrin Fe 1 mg-10 mcg (24)/10 mcg (2) tablet      oxyCODONE-acetaminophen (PERCOCET) 5-325 mg per tablet Take 1-2  tablets by oral route every 4-6 hours as needed       No current facility-administered medications for this visit  Review of Systems   Constitutional: Positive for chills, diaphoresis and fever  HENT: Positive for ear pain and sore throat  Negative for ear discharge  Respiratory: Positive for cough  Negative for shortness of breath  Cardiovascular: Positive for chest pain  Neurological: Positive for headaches  Objective:  Vitals:    05/11/18 0933   BP: 110/70   Pulse: 98   Temp: 97 9 °F (36 6 °C)   SpO2: 98%   Weight: 62 1 kg (137 lb)   Height: 5' 6 5" (1 689 m)     Body mass index is 21 78 kg/m²  Physical Exam   Constitutional: She is oriented to person, place, and time  She appears well-developed and well-nourished  HENT:   Head: Normocephalic  Right Ear: Tympanic membrane, external ear and ear canal normal    Left Ear: Tympanic membrane, external ear and ear canal normal    Nose: Mucosal edema present  Right sinus exhibits no maxillary sinus tenderness and no frontal sinus tenderness  Left sinus exhibits no maxillary sinus tenderness and no frontal sinus tenderness  Mouth/Throat: Uvula swelling present  Oropharyngeal exudate, posterior oropharyngeal edema and posterior oropharyngeal erythema present  Cardiovascular: Normal rate, regular rhythm and normal heart sounds  Pulmonary/Chest: Effort normal and breath sounds normal  She has no wheezes  Neurological: She is alert and oriented to person, place, and time  Skin: Skin is warm and dry  Psychiatric: She has a normal mood and affect  Her behavior is normal  Judgment and thought content normal    Nursing note and vitals reviewed

## 2018-05-13 LAB — BACTERIA THROAT CULT: NORMAL

## 2018-09-12 ENCOUNTER — TELEPHONE (OUTPATIENT)
Dept: FAMILY MEDICINE CLINIC | Facility: CLINIC | Age: 23
End: 2018-09-12

## 2018-09-12 DIAGNOSIS — Z01.419 ENCOUNTER FOR GYNECOLOGICAL EXAMINATION WITHOUT ABNORMAL FINDING: Primary | ICD-10-CM

## 2018-09-12 RX ORDER — NORETHINDRONE ACETATE AND ETHINYL ESTRADIOL AND FERROUS FUMARATE 1MG-20(24)
1 KIT ORAL DAILY
Qty: 84 TABLET | Refills: 0 | Status: SHIPPED | OUTPATIENT
Start: 2018-09-12 | End: 2018-11-27 | Stop reason: SDUPTHER

## 2018-09-12 NOTE — TELEPHONE ENCOUNTER
rf Ranitidine   Patient moved to [facility] - will you still fill until she gets a new doctor ?    [facility] [email or web address] back

## 2018-10-05 ENCOUNTER — TELEPHONE (OUTPATIENT)
Dept: INTERNAL MEDICINE CLINIC | Facility: CLINIC | Age: 23
End: 2018-10-05

## 2018-10-05 ENCOUNTER — OFFICE VISIT (OUTPATIENT)
Dept: INTERNAL MEDICINE CLINIC | Facility: CLINIC | Age: 23
End: 2018-10-05
Payer: COMMERCIAL

## 2018-10-05 VITALS
HEIGHT: 66 IN | WEIGHT: 140.65 LBS | HEART RATE: 68 BPM | TEMPERATURE: 98.5 F | DIASTOLIC BLOOD PRESSURE: 82 MMHG | SYSTOLIC BLOOD PRESSURE: 112 MMHG | BODY MASS INDEX: 22.6 KG/M2

## 2018-10-05 DIAGNOSIS — R10.13 DYSPEPSIA: ICD-10-CM

## 2018-10-05 DIAGNOSIS — R12 HEARTBURN: ICD-10-CM

## 2018-10-05 DIAGNOSIS — Z11.1 TUBERCULOSIS SCREENING: ICD-10-CM

## 2018-10-05 DIAGNOSIS — J45.990 EXERCISE-INDUCED BRONCHOSPASM: Primary | ICD-10-CM

## 2018-10-05 PROBLEM — J02.9 PHARYNGITIS: Status: RESOLVED | Noted: 2018-05-11 | Resolved: 2018-10-05

## 2018-10-05 PROBLEM — Z23: Status: ACTIVE | Noted: 2018-10-05

## 2018-10-05 PROCEDURE — 1036F TOBACCO NON-USER: CPT | Performed by: PHYSICIAN ASSISTANT

## 2018-10-05 PROCEDURE — 86580 TB INTRADERMAL TEST: CPT

## 2018-10-05 PROCEDURE — 99214 OFFICE O/P EST MOD 30 MIN: CPT | Performed by: PHYSICIAN ASSISTANT

## 2018-10-05 RX ORDER — RANITIDINE 300 MG/1
300 TABLET ORAL DAILY PRN
Qty: 30 TABLET | Refills: 0 | Status: SHIPPED | OUTPATIENT
Start: 2018-10-05 | End: 2019-05-20 | Stop reason: SDUPTHER

## 2018-10-05 NOTE — PROGRESS NOTES
Assessment/Plan:    No problem-specific Assessment & Plan notes found for this encounter  Diagnoses and all orders for this visit:    Exercise-induced bronchospasm    Dyspepsia    Heartburn  -     ranitidine (ZANTAC) 300 MG tablet; Take 1 tablet (300 mg total) by mouth daily as needed for indigestion or heartburn for up to 90 days    Tuberculosis screening  -     TB Skin Test    Other orders  -     Cancel: SYRINGE/SINGLE-DOSE VIAL: influenza vaccine, [id number], quadrivalent, 0 5 mL, preservative-free, for patients 3+ yr (FLUZONE)          Subjective:      Patient ID: Severa Calkins is a 26 y o  female  New patient, transfer of care  Patient reports she also needs a physical and TB testing as 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     Patient reports she also graduated from [employer] with a degree in criminal allergy and 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  Patient has a number of conditions on her past medical history as noted below  Torn L ACL 12/2017 had surgery and completed physical therapy  Has a 1 year follow up scheduled in Dec    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, no problems with that  States was given Symbicort from prior PCP for exercise induced bronchospasm  Using in 2x per month  Patient reports she is able to do most activities without any issues however if she is going for a longer run or playing in a game she will use the inhaler  We discussed the potential risks of using this as her inhaler but as patient does not use it on a regular basis there is no additional concern  Based on discussion appears patient had Ventolin but it made her feel slightly jittery  Patient was following with a cardiologist until 2016  Review of records shows that there is a scanned document in her chart    Patient had a PFO that is noted as being closed as of 2016 on an echocardiogram   Patient also noted to have a trace of tricuspid insufficiency  As noted patient was not given any limitations on her physical activity  Other than no long distance cross-country running  On her BCP  Sometimes allergy eye drops    Used to have zantac would take as needed aware acidic foods are a trigger  Patient reports that previously she was told to take the medication before eating  We discussed that really the medication should only be used if she develops symptoms of acid reflux after eating  We discussed that there are number of foods she has already identified as a trigger including pineapple and that she should avoid eating these foods  We discussed that if she continues to have foods that are known to cause her symptoms it could get worse lead to gastritis  We discussed I would be providing her with the low acid diet and lifestyle changes for more information  No OTC meds      We discussed that patient is not up-to-date with all of her immunizations as her last tetanus was in 2007, patient is due for a flu vaccine  Her records shows that she receive hepatitis a but there is no record of hepatitis-B  Patient states she does not have her record but her mother does and she will obtain her vaccine record and bring it with her to her appointment next week when the TB test is read  Patient was agreeable to getting the tuberculosis test however declined flu vaccine today  The following portions of the patient's history were reviewed and updated as appropriate: allergies, current medications, past family history, past medical history, past social history, past surgical history and problem list     Review of Systems   Constitutional: Negative  HENT: Negative  Last dental was March 2018   Eyes:        Glasses for driving at night  Last exam 1 year ago   Respiratory:        Exercise induced bronchospasm   Cardiovascular: Negative for chest pain, palpitations and leg swelling          Was seeing a cardio for a murmur, tricuspid insufficiency and a "hole" states only restriction was no cross country running  States was told no follow up required  Last ECHO approx 2016  Gastrointestinal: Negative  Endocrine: Negative  Negative for cold intolerance and heat intolerance  Genitourinary: Negative  Last GYN 12/2017 had PAP told normal   Musculoskeletal: Negative for arthralgias and myalgias  Fracture L wrist as small child and again as a senior but soft cast   Skin: Negative  Negative for rash  Allergic/Immunologic: Negative for environmental allergies  Neurological: Negative for seizures  Concussion last in high school  Had three was cleared after  Psychiatric/Behavioral: Negative  Objective:      /82 (BP Location: Left arm, Patient Position: Sitting, Cuff Size: Standard)   Pulse 68   Temp 98 5 °F (36 9 °C) (Oral)   Ht 5' 5 5" (1 664 m)   Wt 63 8 kg (140 lb 10 5 oz)   BMI 23 05 kg/m²          Physical Exam   Constitutional: She is oriented to person, place, and time  She appears well-developed and well-nourished  HENT:   Head: Normocephalic and atraumatic  Right Ear: External ear normal    Left Ear: External ear normal    Eyes: Pupils are equal, round, and reactive to light  Conjunctivae are normal    Neck: Normal range of motion  Neck supple  No thyromegaly present  Cardiovascular: Normal rate, regular rhythm and normal heart sounds  Pulmonary/Chest: Effort normal and breath sounds normal  She has no wheezes  She has no rales  Abdominal: Soft  Bowel sounds are normal  There is no tenderness  Musculoskeletal: Normal range of motion  She exhibits no edema, tenderness or deformity  Well healed incisional scars from surgical repair of left torn ACL   Neurological: She is oriented to person, place, and time  She displays normal reflexes  No cranial nerve deficit  Skin: Skin is warm and dry  No rash noted  Psychiatric: She has a normal mood and affect   Her behavior is normal  Judgment and thought content normal    Nursing note and vitals reviewed

## 2018-10-05 NOTE — PATIENT INSTRUCTIONS
Continue inhaler pre physical activity  Can take the Ranitidine but only if needed as discussed  If you continue to get episodes of abdominal pain even with making the dietary changes please schedule a follow-up appointment  Bring in immunization records when come for PPD reading  Continue routine with GYN and dentist    Diet for Stomach Ulcers and Gastritis   AMBULATORY CARE:   A diet for stomach ulcers and gastritis  is a meal plan that limits foods that irritate your stomach  Certain foods may worsen symptoms such as stomach pain, bloating, heartburn, or indigestion  Foods to limit or avoid:  You may need to avoid acidic, spicy, or high-fat foods  Not all foods affect everyone the same way  You will need to learn which foods worsen your symptoms and limit those foods  The following are some foods that may worsen ulcer or gastritis symptoms:  · Beverages:      ¨ Whole milk and chocolate milk    ¨ Hot cocoa and cola    ¨ Any beverage with caffeine    ¨ Regular and decaffeinated coffee    ¨ Peppermint and spearmint tea    ¨ Green and black tea, with or without caffeine    ¨ Orange and grapefruit juices    ¨ Drinks that contain alcohol    · Spices and seasonings:      ¨ Black and red pepper    ¨ Chili powder    ¨ Mustard seed and nutmeg    · Other foods:      ¨ Dairy foods made from whole milk or cream    ¨ Chocolate    ¨ Spicy or strongly flavored cheeses, such as jalapeno or black pepper    ¨ Highly seasoned, high-fat meats, such as sausage, salami, tomlinson, ham, and cold cuts    ¨ Hot chiles and peppers    ¨ Tomato products, such as tomato paste, tomato sauce, or tomato juice  Foods to include:  Eat a variety of healthy foods from all the food groups  Eat fruits, vegetables, whole grains, and fat-free or low-fat dairy foods  Whole grains include whole-wheat breads, cereals, pasta, and brown rice  Choose lean meats, poultry (chicken and turkey), fish, beans, eggs, and nuts   A healthy meal plan is low in unhealthy fats, salt, and added sugar  Healthy fats include olive oil and canola oil  Ask your dietitian for more information about a healthy diet  Other helpful guidelines:   · Do not eat right before bedtime  Stop eating at least 2 hours before bedtime  · Eat small, frequent meals  Your stomach may tolerate small, frequent meals better than large meals  © 2017 [address] [employer] Information is for End User's use only and may not be sold, redistributed or otherwise used for commercial purposes  All illustrations and images included in CareNotes® are the copyrighted property of [employer]  or Mehdi Salcido  The above information is an  only  It is not intended as medical advice for individual conditions or treatments  Talk to your doctor, nurse or pharmacist before following any medical regimen to see if it is safe and effective for you

## 2018-10-05 NOTE — TELEPHONE ENCOUNTER
([address]) form to be completed at today's visit  Form handed to MA (Kevin Forrest)  Please complete, review and copy form before handing back to patient  If form is not completed at the time of visit, please update this message

## 2018-10-08 LAB
INDURATION: 0 MM
TB SKIN TEST: NEGATIVE

## 2018-11-27 DIAGNOSIS — Z01.419 ENCOUNTER FOR GYNECOLOGICAL EXAMINATION WITHOUT ABNORMAL FINDING: ICD-10-CM

## 2018-11-27 RX ORDER — NORETHINDRONE ACETATE AND ETHINYL ESTRADIOL 1MG-20(24)
1 KIT ORAL DAILY
Qty: 84 TABLET | Refills: 0 | Status: SHIPPED | OUTPATIENT
Start: 2018-11-27 | End: 2019-01-04 | Stop reason: SDUPTHER

## 2019-01-04 ENCOUNTER — ANNUAL EXAM (OUTPATIENT)
Dept: OBGYN CLINIC | Facility: CLINIC | Age: 24
End: 2019-01-04
Payer: COMMERCIAL

## 2019-01-04 VITALS
BODY MASS INDEX: 22.02 KG/M2 | DIASTOLIC BLOOD PRESSURE: 66 MMHG | SYSTOLIC BLOOD PRESSURE: 104 MMHG | WEIGHT: 137 LBS | RESPIRATION RATE: 14 BRPM | HEIGHT: 66 IN

## 2019-01-04 DIAGNOSIS — Z01.419 ENCOUNTER FOR GYNECOLOGICAL EXAMINATION WITHOUT ABNORMAL FINDING: ICD-10-CM

## 2019-01-04 PROCEDURE — 99395 PREV VISIT EST AGE 18-39: CPT | Performed by: PHYSICIAN ASSISTANT

## 2019-01-04 RX ORDER — NORETHINDRONE ACETATE AND ETHINYL ESTRADIOL AND FERROUS FUMARATE 1MG-20(24)
1 KIT ORAL DAILY
Qty: 84 TABLET | Refills: 3 | Status: SHIPPED | OUTPATIENT
Start: 2019-01-04 | End: 2019-12-11 | Stop reason: SDUPTHER

## 2019-01-04 NOTE — PROGRESS NOTES
Kristen Kwok  [birth year]    CC:  Yearly exam    S:  26 y o  female here for yearly exam  Her cycles are regular, not heavy or crampy  Earlier this year she had knee surgery; she was not exercising for a while  She was now cleared to go back to exercising and she finds that mid-cycle she can have a little spotting when she runs  She will call if this is more bothersome to her  She has never been sexually active but has a boyfriend of four years; he lives in [facility]  She hopes they will get  one day  She is currently working at a psychologist office doing intakes and is a   She will go back to grad school this coming year  She uses Blisovi 24 Fe for cycle control  Last Pap  12/21/17 neg    Current Outpatient Prescriptions:     Adapalene-Benzoyl Peroxide (EPIDUO FORTE) 0 3-2 5 % GEL, Epiduo Forte 0 3 %-2 5 % topical gel with pump, Disp: , Rfl:     budesonide-formoterol (SYMBICORT) 160-4 5 mcg/act inhaler, Inhale 2 puffs 2 (two) times a day, Disp: , Rfl:     norethindrone-ethinyl estradiol-ferrous fumarate (BLISOVI 24 FE) 1-20 MG-MCG(24) per tablet, Take 1 tablet by mouth daily, Disp: 84 tablet, Rfl: 3    ranitidine (ZANTAC) 300 MG tablet, Take 1 tablet (300 mg total) by mouth daily as needed for indigestion or heartburn for up to 90 days, Disp: 30 tablet, Rfl: 0  Social History     Social History    Marital status: Single     Spouse name: N/A    Number of children: N/A    Years of education: N/A     Occupational History    Not on file       Social History Main Topics    Smoking status: Never Smoker    Smokeless tobacco: Never Used    Alcohol use No    Drug use: No    Sexual activity: Not Currently     Other Topics Concern    Not on file     Social History Narrative    Always uses seat belt     Caffeine use     Exercising regularly     Seeing a dentist     Family History   Problem Relation Age of Onset    Diabetes Family     Hiatal hernia Family     Hypertension Family  No Known Problems Mother     No Known Problems Father      Past Medical History:   Diagnosis Date    Cardiac disorder     Mild tricuspid insufficiency          O:  Blood pressure 104/66, resp  rate 14, height 5' 6" (1 676 m), weight 62 1 kg (137 lb), last menstrual period 12/21/2018  Patient appears well and is not in distress  Neck is supple without masses  Breasts are symmetrical without mass, tenderness, nipple discharge, skin changes or adenopathy  Abdomen is soft and nontender without masses  External genitals are normal without lesions or rashes  Pelvic exam is declined due to no symptoms and not sexually active  A:  Yearly exam      P:   Blisovi 24 Fe sent to mail order   Pap [phone] one year for yearly exam or sooner as needed

## 2019-05-20 DIAGNOSIS — R12 HEARTBURN: ICD-10-CM

## 2019-05-20 RX ORDER — RANITIDINE 300 MG/1
TABLET ORAL
Qty: 30 TABLET | Refills: 3 | Status: SHIPPED | OUTPATIENT
Start: 2019-05-20 | End: 2020-05-13 | Stop reason: ALTCHOICE

## 2019-05-31 ENCOUNTER — TELEPHONE (OUTPATIENT)
Dept: INTERNAL MEDICINE CLINIC | Facility: CLINIC | Age: 24
End: 2019-05-31

## 2019-06-05 RX ORDER — NORETHINDRONE ACETATE AND ETHINYL ESTRADIOL AND FERROUS FUMARATE 1MG-20(24)
KIT ORAL
COMMUNITY
End: 2019-06-07 | Stop reason: SDUPTHER

## 2019-06-07 ENCOUNTER — OFFICE VISIT (OUTPATIENT)
Dept: INTERNAL MEDICINE CLINIC | Facility: CLINIC | Age: 24
End: 2019-06-07

## 2019-06-07 VITALS
BODY MASS INDEX: 21.75 KG/M2 | SYSTOLIC BLOOD PRESSURE: 120 MMHG | HEIGHT: 66 IN | TEMPERATURE: 98.3 F | HEART RATE: 72 BPM | WEIGHT: 135.36 LBS | DIASTOLIC BLOOD PRESSURE: 80 MMHG

## 2019-06-07 DIAGNOSIS — Z02.1 PHYSICAL EXAM, PRE-EMPLOYMENT: ICD-10-CM

## 2019-06-07 DIAGNOSIS — J45.990 EXERCISE-INDUCED BRONCHOSPASM: Primary | ICD-10-CM

## 2019-06-07 DIAGNOSIS — I36.1 NON-RHEUMATIC TRICUSPID VALVE INSUFFICIENCY: ICD-10-CM

## 2019-06-07 DIAGNOSIS — R10.13 DYSPEPSIA: ICD-10-CM

## 2019-06-07 PROBLEM — Z11.1 TUBERCULOSIS SCREENING: Status: RESOLVED | Noted: 2018-10-05 | Resolved: 2019-06-07

## 2019-06-07 PROCEDURE — 99213 OFFICE O/P EST LOW 20 MIN: CPT | Performed by: PHYSICIAN ASSISTANT

## 2019-06-07 RX ORDER — ALBUTEROL SULFATE 90 UG/1
AEROSOL, METERED RESPIRATORY (INHALATION)
Qty: 1 INHALER | Refills: 0 | Status: SHIPPED | OUTPATIENT
Start: 2019-06-07

## 2019-09-12 ENCOUNTER — OFFICE VISIT (OUTPATIENT)
Dept: INTERNAL MEDICINE CLINIC | Facility: CLINIC | Age: 24
End: 2019-09-12

## 2019-09-12 VITALS
HEART RATE: 84 BPM | DIASTOLIC BLOOD PRESSURE: 80 MMHG | HEIGHT: 66 IN | SYSTOLIC BLOOD PRESSURE: 104 MMHG | WEIGHT: 131.84 LBS | TEMPERATURE: 97.4 F | BODY MASS INDEX: 21.19 KG/M2

## 2019-09-12 DIAGNOSIS — K52.9 GASTROENTERITIS: Primary | Chronic | ICD-10-CM

## 2019-09-12 PROCEDURE — 99213 OFFICE O/P EST LOW 20 MIN: CPT | Performed by: PHYSICIAN ASSISTANT

## 2019-09-12 NOTE — PROGRESS NOTES
Assessment/Plan:    As per our discussion I did review the labs that you had completed in the urgent care center and advised that you did not have any abnormalities on that testing  No indication of elevated white blood cell count for infection, kidney liver functions were all normal and urine was also negative for bacterial infection  Based on your description and normal results agree most likely viral gastroenteritis  As we discussed this is a self-limited condition and can resolve in 3-5 days typically  We did review however that should you have any change in her symptoms such as getting worse instead of resolving new symptoms to please return to the office  You have already noted improvement in appetite and have started advancing her diet  As reviewed please continue a bland diet until able to tolerate regular meals  No problem-specific Assessment & Plan notes found for this encounter  Diagnoses and all orders for this visit:    Gastroenteritis          Subjective:      Patient ID: Vanesa Mcdonald is a 22 y o  female  Pt here for follow up for visit to [facility] 2 days ago  Patient states started with abdominal pain 9/10/19 when woke up  Patient reports pain was not specific but was more generalized to her abdomen including central left upper and middle  Patient reports she tried to take it easy that day and monitor what she was eating  Patient reports she went to bed earlier that evening however the pain actually woke her up from sleep and because she had never had pain like this before she did go to the urgent care  Patient did bring a copy of labs that were completed that showed white blood cell count was normal urine was negative for infection metabolic profile otherwise all normal   No imaging was completed per patient  States was told viral    Patient reports that she did have abdominal pain with some associated nausea but no vomiting or diarrhea    Patient denies any constipation or history of same  Patient denied any urinary changes  Patient did not have any fever or chills  Did not have appetite initially and states yesterday felt like eating so had soup and toast and tolerating well  Reviewed with patient as she is already improving with no additional medications agree likely a viral cause  Patient however advised on if symptoms do not completely resolve or return or change with more pain fever chills to contact our office or return to an emergency room for evaluation  The following portions of the patient's history were reviewed and updated as appropriate: allergies, current medications, past family history, past medical history, past social history, past surgical history and problem list     Review of Systems   Constitutional: Negative  HENT: Negative  Respiratory: Negative  Negative for cough and shortness of breath  Cardiovascular: Negative  Negative for chest pain and palpitations  Gastrointestinal: Positive for abdominal pain  Negative for constipation, diarrhea, nausea and vomiting  Genitourinary: Negative  Musculoskeletal: Negative  Psychiatric/Behavioral: Negative  Objective:      /80 (BP Location: Left arm, Patient Position: Sitting, Cuff Size: Standard)   Pulse 84   Temp (!) 97 4 °F (36 3 °C) (Oral)   Ht 5' 5 5" (1 664 m)   Wt 59 8 kg (131 lb 13 4 oz)   BMI 21 60 kg/m²          Physical Exam   Constitutional: She appears well-developed and well-nourished  HENT:   Head: Normocephalic  Eyes: Pupils are equal, round, and reactive to light  Cardiovascular: Normal rate, regular rhythm and normal heart sounds  Pulmonary/Chest: Effort normal and breath sounds normal  No stridor  Abdominal: Soft  Bowel sounds are normal  She exhibits no pulsatile midline mass  There is no rigidity, no rebound, no guarding, no CVA tenderness, no tenderness at McBurney's point and negative Vazquez's sign  Patient does have some very general discomfort to palpation in epigastric and just inferior to epigastric but no masses no guarding no rebound  Musculoskeletal: Normal range of motion  Skin: No rash noted  Psychiatric: She has a normal mood and affect  Her behavior is normal    Nursing note and vitals reviewed

## 2019-09-12 NOTE — PATIENT INSTRUCTIONS
As per our discussion I did review the labs that you had completed in the urgent care center and advised that you did not have any abnormalities on that testing  No indication of elevated white blood cell count for infection, kidney liver functions were all normal and urine was also negative for bacterial infection  Based on your description and normal results agree most likely viral gastroenteritis  As we discussed this is a self-limited condition and can resolve in 3-5 days typically  We did review however that should you have any change in her symptoms such as getting worse instead of resolving new symptoms to please return to the office  You have already noted improvement in appetite and have started advancing her diet  As reviewed please continue a bland diet until able to tolerate regular meals  Gastroenteritis   AMBULATORY CARE:   Gastroenteritis , or stomach flu, is an infection of the stomach and intestines  It is caused by bacteria, parasites, or viruses  Common symptoms include the following:   · Diarrhea or gas    · Nausea, vomiting, or poor appetite    · Abdominal cramps, pain, or gurgling    · Fever    · Tiredness or weakness    · Headaches or muscle aches with any of the above symptoms  Call 911 for any of the following:   · You have trouble breathing or a very fast pulse  Seek care immediately if:   · You see blood in your diarrhea  · You cannot stop vomiting  · You have not urinated for 12 hours  · You feel like you are going to faint  Contact your healthcare provider if:   · You have a fever  · You continue to vomit or have diarrhea, even after treatment  · You see worms in your diarrhea  · Your mouth or eyes are dry  You are not urinating as much or as often  · You have questions or concerns about your condition or care  Treatment for gastroenteritis  may include medicines to slow or stop your diarrhea or vomiting   You may also need medicines to treat an infection caused by bacteria or a parasite  Manage your symptoms:   · Drink liquids as directed  Ask your healthcare provider how much liquid to drink each day, and which liquids are best for you  You may also need to drink an oral rehydration solution (ORS)  An ORS has the right amounts of sugar, salt, and minerals in water to replace body fluids  · Eat bland foods  When you feel hungry, begin eating soft, bland foods  Examples are bananas, clear soup, potatoes, and applesauce  Do not have dairy products, alcohol, sugary drinks, or drinks with caffeine until you feel better  · Rest as much as possible  Slowly start to do more each day when you begin to feel better  Prevent the spread of germs:  Gastroenteritis can spread easily  Keep yourself, your family, and your surroundings clean to help prevent the spread of gastroenteritis:  · Wash your hands often  Use soap and water  Wash your hands after you use the bathroom, change a child's diapers, or sneeze  Wash your hands before you prepare or eat food  · Clean surfaces and do laundry often  Wash your clothes and towels separately from the rest of the laundry  Clean surfaces in your home with antibacterial  or bleach  · Clean food thoroughly and cook safely  Wash raw vegetables before you cook  Cook meat, fish, and eggs fully  Do not use the same dishes for raw meat as you do for other foods  Refrigerate any leftover food immediately  · Be aware when you camp or travel  Drink only clean water  Do not drink from rivers or lakes unless you purify or boil the water first  When you travel, drink bottled water and do not add ice  Do not eat fruit that has not been peeled  Do not eat raw fish or meat that is not fully cooked  Follow up with your healthcare provider as directed:  Write down your questions so you remember to ask them during your visits     © 2017 [employer] Information is for End User's use only and may not be sold, redistributed or otherwise used for commercial purposes  All illustrations and images included in CareNotes® are the copyrighted property of [employer]  or Mehdi Salcido  The above information is an  only  It is not intended as medical advice for individual conditions or treatments  Talk to your doctor, nurse or pharmacist before following any medical regimen to see if it is safe and effective for you

## 2019-10-02 ENCOUNTER — OFFICE VISIT (OUTPATIENT)
Dept: INTERNAL MEDICINE CLINIC | Facility: CLINIC | Age: 24
End: 2019-10-02

## 2019-10-02 ENCOUNTER — TELEPHONE (OUTPATIENT)
Dept: INTERNAL MEDICINE CLINIC | Facility: CLINIC | Age: 24
End: 2019-10-02

## 2019-10-02 VITALS
HEIGHT: 66 IN | TEMPERATURE: 98.1 F | BODY MASS INDEX: 21.05 KG/M2 | SYSTOLIC BLOOD PRESSURE: 110 MMHG | WEIGHT: 130.95 LBS | DIASTOLIC BLOOD PRESSURE: 70 MMHG | HEART RATE: 72 BPM

## 2019-10-02 DIAGNOSIS — Z11.1 TUBERCULOSIS SCREENING: Primary | ICD-10-CM

## 2019-10-02 DIAGNOSIS — J45.990 EXERCISE-INDUCED BRONCHOSPASM: ICD-10-CM

## 2019-10-02 PROBLEM — R10.13 DYSPEPSIA: Status: RESOLVED | Noted: 2018-10-05 | Resolved: 2019-10-02

## 2019-10-02 PROBLEM — K52.9 GASTROENTERITIS: Chronic | Status: RESOLVED | Noted: 2019-09-12 | Resolved: 2019-10-02

## 2019-10-02 PROCEDURE — 86580 TB INTRADERMAL TEST: CPT | Performed by: PHYSICIAN ASSISTANT

## 2019-10-02 PROCEDURE — 1036F TOBACCO NON-USER: CPT | Performed by: PHYSICIAN ASSISTANT

## 2019-10-02 PROCEDURE — 99212 OFFICE O/P EST SF 10 MIN: CPT | Performed by: PHYSICIAN ASSISTANT

## 2019-10-02 PROCEDURE — 3008F BODY MASS INDEX DOCD: CPT | Performed by: PHYSICIAN ASSISTANT

## 2019-10-02 NOTE — TELEPHONE ENCOUNTER
Patient is here today for PPD placement  PPD placed in left forearm  Patient does not have paperwork to be completed  Patient need a print out of the reading  TB test was placed on 10/2/2019 at 8:55 am     Patient is aware to return for TB test reading on 10/4/2019  after 8:55 am     Patient was provided a TB appointment reminder with this information

## 2019-10-02 NOTE — PROGRESS NOTES
Assessment/Plan:  PPD placed and confirm can return on 10/4 for reading  Continue rescue inhaler as pretreatment before going running as you have been doing well with this  We did review importance of getting flu vaccine and October being the ideal month  You are aware you can come for a nurse visit, during or flu clinics or you may receive at your pharmacy  No problem-specific Assessment & Plan notes found for this encounter  Diagnoses and all orders for this visit:    Tuberculosis screening    Exercise-induced bronchospasm          Subjective:      Patient ID: Saurabh Hutchison is a 22 y o  female  Pt here as just needed PPD for her employer  Patient confirms there are no forms she just needs a printout of the results  States overall feeling really well    States only needs to use the rescue inhaler before going running, just the GYM is OK  Patient confirms that the gastroenteritis that she had earlier in September has resolved  She states within a week she was able to resume normal foods without issues  Patient is scheduled to see her gyn January of 2020  No additional medical concerns on today's visit  Patient declined flu vaccine today but 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  Reviewed with patient that it is important that everyone get vaccinated and that she can make a nurse visit or come during 1 of our food clinics or she may even go to her pharmacy to receive  The following portions of the patient's history were reviewed and updated as appropriate: allergies, current medications, past family history, past medical history, past social history, past surgical history and problem list     Review of Systems   Constitutional: Negative  Respiratory: Negative for cough, chest tightness and shortness of breath  Gastrointestinal: Negative for abdominal pain, diarrhea, nausea and vomiting  Skin: Negative for rash           Objective:      /70 (BP Location: Right arm, Patient Position: Sitting, Cuff Size: Standard)   Pulse 72   Temp 98 1 °F (36 7 °C) (Oral)   Ht 5' 5 5" (1 664 m)   Wt 59 4 kg (130 lb 15 3 oz)   BMI 21 46 kg/m²          Physical Exam   Constitutional: She appears well-developed and well-nourished  Cardiovascular: Normal rate, regular rhythm and normal heart sounds  Pulmonary/Chest: Effort normal and breath sounds normal    Skin: No rash noted  Psychiatric: She has a normal mood and affect   Her behavior is normal

## 2019-10-04 LAB
INDURATION: 0 MM
TB SKIN TEST: NEGATIVE

## 2019-12-11 DIAGNOSIS — Z01.419 ENCOUNTER FOR GYNECOLOGICAL EXAMINATION WITHOUT ABNORMAL FINDING: ICD-10-CM

## 2019-12-11 RX ORDER — NORETHINDRONE ACETATE AND ETHINYL ESTRADIOL AND FERROUS FUMARATE 1MG-20(24)
1 KIT ORAL DAILY
Qty: 84 TABLET | Refills: 0 | Status: SHIPPED | OUTPATIENT
Start: 2019-12-11 | End: 2020-01-07 | Stop reason: SDUPTHER

## 2020-01-07 ENCOUNTER — ANNUAL EXAM (OUTPATIENT)
Dept: OBGYN CLINIC | Facility: CLINIC | Age: 25
End: 2020-01-07
Payer: COMMERCIAL

## 2020-01-07 VITALS
WEIGHT: 136 LBS | SYSTOLIC BLOOD PRESSURE: 110 MMHG | BODY MASS INDEX: 22.66 KG/M2 | DIASTOLIC BLOOD PRESSURE: 60 MMHG | HEIGHT: 65 IN

## 2020-01-07 DIAGNOSIS — Z01.419 ENCNTR FOR GYN EXAM (GENERAL) (ROUTINE) W/O ABN FINDINGS: ICD-10-CM

## 2020-01-07 DIAGNOSIS — Z01.419 ENCOUNTER FOR GYNECOLOGICAL EXAMINATION WITHOUT ABNORMAL FINDING: ICD-10-CM

## 2020-01-07 PROBLEM — Z11.1 TUBERCULOSIS SCREENING: Status: RESOLVED | Noted: 2018-10-05 | Resolved: 2020-01-07

## 2020-01-07 PROBLEM — Z02.1 PHYSICAL EXAM, PRE-EMPLOYMENT: Status: RESOLVED | Noted: 2019-06-07 | Resolved: 2020-01-07

## 2020-01-07 PROCEDURE — 99395 PREV VISIT EST AGE 18-39: CPT | Performed by: PHYSICIAN ASSISTANT

## 2020-01-07 RX ORDER — NORETHINDRONE ACETATE AND ETHINYL ESTRADIOL AND FERROUS FUMARATE 1MG-20(24)
1 KIT ORAL DAILY
Qty: 84 TABLET | Refills: 3 | Status: SHIPPED | OUTPATIENT
Start: 2020-01-07 | End: 2020-05-13

## 2020-01-07 NOTE — PROGRESS NOTES
Kristen ARGUETA Sundar  [birth year]    CC:  Yearly exam    S:  22 y o  female here for yearly exam  Her cycles are regular, not heavy or crampy  Sexual activity: She has never been sexually active  She is now engaged to be  this coming summer  They may plan for a pregnancy in 5 years or so  Contraception:  She uses Blisovi 24 for cycle control  STD testing:  She does not request STD testing today  Gardasil:  She has had the Gardasil series  We reviewed [facility] guidelines for Pap testing       Last Pap 12/21/17 neg    Family hx of breast cancer: paternal grandmother  Family hx of ovarian cancer:  no  Family hx of colon cancer: no    Current Outpatient Medications:     albuterol (VENTOLIN HFA) 90 mcg/act inhaler, Take 1 or 2 puffs 15 minutes before physical activity, Disp: 1 Inhaler, Rfl: 0    norethindrone-ethinyl estradiol-ferrous fumarate (BLISOVI 24 FE) 1-20 MG-MCG(24) per tablet, Take 1 tablet by mouth daily, Disp: 84 tablet, Rfl: 0    Olopatadine HCl (PAZEO) 0 7 % SOLN, Pazeo 0 7 % eye drops  instill 1 drop into both eyes once daily, Disp: , Rfl:     ranitidine (ZANTAC) 300 MG tablet, TAKE 1 TABLET BY MOUTH DAILY AS NEEDED FOR INDIGESTION OR HEARTBURN FOR UP TO 90 DAYS, Disp: 30 tablet, Rfl: 3  Social History     Socioeconomic History    Marital status: Single     Spouse name: Not on file    Number of children: Not on file    Years of education: Not on file    Highest education level: Not on file   Occupational History    Not on file   Social Needs    Financial resource strain: Not on file    Food insecurity:     Worry: Not on file     Inability: Not on file    Transportation needs:     Medical: Not on file     Non-medical: Not on file   Tobacco Use    Smoking status: Never Smoker    Smokeless tobacco: Never Used   Substance and Sexual Activity    Alcohol use: No    Drug use: No    Sexual activity: Not Currently   Lifestyle    Physical activity:     Days per week: Not on file Minutes per session: Not on file    Stress: Not on file   Relationships    Social connections:     Talks on phone: Not on file     Gets together: Not on file     Attends Mandaen service: Not on file     Active member of club or organization: Not on file     Attends meetings of clubs or organizations: Not on file     Relationship status: Not on file    Intimate partner violence:     Fear of current or ex partner: Not on file     Emotionally abused: Not on file     Physically abused: Not on file     Forced sexual activity: Not on file   Other Topics Concern    Not on file   Social History Narrative    Always uses seat belt     Caffeine use     Exercising regularly     Seeing a dentist     Family History   Problem Relation Age of Onset    Diabetes Family     Hiatal hernia Family     Hypertension Family     No Known Problems Mother     No Known Problems Father     Breast cancer Paternal Grandmother 68     Past Medical History:   Diagnosis Date    Cardiac disorder     Mild tricuspid insufficiency        Review of Systems   Respiratory: Negative  Cardiovascular: Negative  Gastrointestinal: Negative for constipation and diarrhea  Genitourinary: Negative for difficulty urinating, pelvic pain, vaginal bleeding, vaginal discharge, itching or odor  O:  Blood pressure 110/60, height 5' 5 35" (1 66 m), weight 61 7 kg (136 lb), last menstrual period 12/20/2019  Patient appears well and is not in distress  Neck is supple without masses  Breasts are symmetrical without mass, tenderness, nipple discharge, skin changes or adenopathy  Abdomen is soft and nontender without masses  External genitals are normal without lesions or rashes  Urethra and urethral meatus are normal  Bladder is normal to palpation  Vagina is normal without discharge or bleeding  Cervix is normal without discharge or lesion  Uterus is normal, mobile, nontender without palpable mass    Adnexa are normal, nontender, without palpable mass  A:  Yearly exam      P:   Pap 12/2020   Loestrin 24 sent to pharmacy     RTO one year for yearly exam or sooner as needed

## 2020-05-13 ENCOUNTER — TELEMEDICINE (OUTPATIENT)
Dept: OBGYN CLINIC | Facility: CLINIC | Age: 25
End: 2020-05-13
Payer: COMMERCIAL

## 2020-05-13 VITALS — WEIGHT: 135 LBS | BODY MASS INDEX: 22.22 KG/M2

## 2020-05-13 DIAGNOSIS — Z01.419 ENCOUNTER FOR GYNECOLOGICAL EXAMINATION WITHOUT ABNORMAL FINDING: Primary | ICD-10-CM

## 2020-05-13 PROCEDURE — 99214 OFFICE O/P EST MOD 30 MIN: CPT | Performed by: PHYSICIAN ASSISTANT

## 2020-05-13 RX ORDER — NORETHINDRONE ACETATE AND ETHINYL ESTRADIOL 1MG-20(21)
1 KIT ORAL DAILY
Qty: 112 TABLET | Refills: 2 | Status: SHIPPED | OUTPATIENT
Start: 2020-05-13 | End: 2020-05-13 | Stop reason: SDUPTHER

## 2020-05-13 RX ORDER — NORETHINDRONE ACETATE AND ETHINYL ESTRADIOL 1MG-20(21)
KIT ORAL
Qty: 112 TABLET | Refills: 2 | Status: SHIPPED | OUTPATIENT
Start: 2020-05-13 | End: 2020-11-30

## 2020-08-03 ENCOUNTER — OFFICE VISIT (OUTPATIENT)
Dept: OBGYN CLINIC | Facility: MEDICAL CENTER | Age: 25
End: 2020-08-03
Payer: COMMERCIAL

## 2020-08-03 VITALS
BODY MASS INDEX: 21.57 KG/M2 | WEIGHT: 134.25 LBS | TEMPERATURE: 98.5 F | DIASTOLIC BLOOD PRESSURE: 80 MMHG | SYSTOLIC BLOOD PRESSURE: 112 MMHG | HEIGHT: 66 IN

## 2020-08-03 DIAGNOSIS — N64.4 MASTODYNIA: Primary | ICD-10-CM

## 2020-08-03 PROCEDURE — 99213 OFFICE O/P EST LOW 20 MIN: CPT | Performed by: PHYSICIAN ASSISTANT

## 2020-08-03 PROCEDURE — 3008F BODY MASS INDEX DOCD: CPT | Performed by: PHYSICIAN ASSISTANT

## 2020-08-03 PROCEDURE — 1036F TOBACCO NON-USER: CPT | Performed by: PHYSICIAN ASSISTANT

## 2020-08-03 NOTE — PROGRESS NOTES
Kristen Kwok  [birth year]    S:  26 y o  female here for a problem visit  She complains of sharp bilateral breast pains for the past month or so  It is located in the outer breasts and somewhat on the inner breasts  She does not feel any lumps  She denies increase in caffeine intake but is more stressed lately with a late August wedding coming up in [place]       Past Medical History:   Diagnosis Date    Cardiac disorder     Mild tricuspid insufficiency      Family History   Problem Relation Age of Onset    Diabetes Family     Hiatal hernia Family     Hypertension Family     No Known Problems Mother     No Known Problems Father     Breast cancer Paternal Grandmother 68    Colon cancer Neg Hx     Ovarian cancer Neg Hx      Social History     Socioeconomic History    Marital status: Single     Spouse name: Not on file    Number of children: Not on file    Years of education: Not on file    Highest education level: Not on file   Occupational History    Not on file   Social Needs    Financial resource strain: Not on file    Food insecurity     Worry: Not on file     Inability: Not on file    Transportation needs     Medical: Not on file     Non-medical: Not on file   Tobacco Use    Smoking status: Never Smoker    Smokeless tobacco: Never Used   Substance and Sexual Activity    Alcohol use: No    Drug use: No    Sexual activity: Not Currently     Birth control/protection: OCP   Lifestyle    Physical activity     Days per week: Not on file     Minutes per session: Not on file    Stress: Not on file   Relationships    Social connections     Talks on phone: Not on file     Gets together: Not on file     Attends Congregation service: Not on file     Active member of club or organization: Not on file     Attends meetings of clubs or organizations: Not on file     Relationship status: Not on file    Intimate partner violence     Fear of current or ex partner: Not on file     Emotionally abused: Not on file     Physically abused: Not on file     Forced sexual activity: Not on file   Other Topics Concern    Not on file   Social History Narrative    Always uses seat belt     Caffeine use     Exercising regularly     Seeing a dentist       Review of Systems   Respiratory: Negative  Cardiovascular: Negative  Gastrointestinal: Negative for constipation and diarrhea  Genitourinary: Negative for difficulty urinating, pelvic pain, vaginal bleeding, vaginal discharge, itching or odor  O:  /80 (BP Location: Left arm, Patient Position: Sitting, Cuff Size: Standard)   Temp 98 5 °F (36 9 °C)   Ht 5' 5 5"   Wt 60 9 kg (134 lb 4 oz)   Breastfeeding No   BMI 22 00 kg/m²   She appears well and is in no distress  Abdomen is soft and nontender  Bilateral breasts are without masses, nipple discharge or adenopathy  There is tenderness in the outer aspects and around 3 o'clock on the right and 9 o'clock on the left  A/P: Mastodynia  Recommended moist heat, anti-inflammatories  Could consider vitamin E, evening primrose oil  If she is not a lot better in 3 weeks, will order bilateral breast ultrasound

## 2020-09-14 ENCOUNTER — OFFICE VISIT (OUTPATIENT)
Dept: FAMILY MEDICINE CLINIC | Facility: CLINIC | Age: 25
End: 2020-09-14
Payer: COMMERCIAL

## 2020-09-14 VITALS
SYSTOLIC BLOOD PRESSURE: 118 MMHG | DIASTOLIC BLOOD PRESSURE: 72 MMHG | BODY MASS INDEX: 21.5 KG/M2 | OXYGEN SATURATION: 99 % | WEIGHT: 137 LBS | HEIGHT: 67 IN | TEMPERATURE: 97.9 F | HEART RATE: 93 BPM

## 2020-09-14 DIAGNOSIS — Z13.0 SCREENING FOR DEFICIENCY ANEMIA: ICD-10-CM

## 2020-09-14 DIAGNOSIS — Z13.1 SCREENING FOR DIABETES MELLITUS: ICD-10-CM

## 2020-09-14 DIAGNOSIS — Z00.00 ANNUAL PHYSICAL EXAM: Primary | ICD-10-CM

## 2020-09-14 DIAGNOSIS — Z11.1 TUBERCULOSIS SCREENING: ICD-10-CM

## 2020-09-14 DIAGNOSIS — Z23 NEED FOR TUBERCULOSIS VACCINATION: ICD-10-CM

## 2020-09-14 PROCEDURE — 86580 TB INTRADERMAL TEST: CPT

## 2020-09-14 PROCEDURE — 3725F SCREEN DEPRESSION PERFORMED: CPT | Performed by: PHYSICIAN ASSISTANT

## 2020-09-14 PROCEDURE — 99395 PREV VISIT EST AGE 18-39: CPT | Performed by: PHYSICIAN ASSISTANT

## 2020-09-14 NOTE — PATIENT INSTRUCTIONS

## 2020-09-14 NOTE — PROGRESS NOTES
[facility] [address] [facility]    NAME: Kristen Kwok  AGE: 26 y o  SEX: female  : 1995     DATE: 2020     Assessment and Plan:     Problem List Items Addressed This Visit        Other    Tuberculosis screening    Annual physical exam - Primary      Other Visit Diagnoses     Screening for deficiency anemia        Relevant Orders    CBC and Platelet    Screening for diabetes mellitus        Relevant Orders    Comprehensive metabolic panel          Immunizations and preventive care screenings were discussed with patient today  Appropriate education was printed on patient's after visit summary  Counseling:  Alcohol/drug use: discussed moderation in alcohol intake, the recommendations for healthy alcohol use, and avoidance of illicit drug use  Dental Health: discussed importance of regular tooth brushing, flossing, and dental visits  Injury prevention: discussed safety/seat belts, safety helmets, smoke detectors, carbon dioxide detectors, and smoking near bedding or upholstery  Sexual health: discussed sexually transmitted diseases, partner selection, use of condoms, avoidance of unintended pregnancy, and contraceptive alternatives  · Exercise: the importance of regular exercise/physical activity was discussed  Recommend exercise 3-5 times per week for at least 30 minutes  No follow-ups on file  Chief Complaint:     Chief Complaint   Patient presents with    Physical Exam     re establishing care, no complaint of pain    Immunizations     patient is requesting PPD for work      History of Present Illness:     Adult Annual Physical   Patient here for a comprehensive physical exam  The patient reports no problems  Diet and Physical Activity  · Diet/Nutrition: well balanced diet and limited junk food  · Exercise: moderate cardiovascular exercise and 3-4 times a week on average  Depression Screening  PHQ-9 Depression Screening    PHQ-9:    Frequency of the following problems over the past two weeks:       Little interest or pleasure in doing things:  0 - not at all  Feeling down, depressed, or hopeless:  0 - not at all  PHQ-2 Score:  0       General Health  · Sleep: sleeps well  · Hearing: normal - bilateral   · Vision: goes for regular eye exams and wears glasses  · Dental: regular dental visits  /GYN Health  · Last menstrual period: 05/10/2020  · Contraceptive method: oral contraceptives  · History of STDs?: no      Review of Systems:     Review of Systems   Constitutional: Negative for activity change, appetite change, fatigue, fever and unexpected weight change  HENT: Negative for dental problem, ear pain, hearing loss, mouth sores, nosebleeds, rhinorrhea, tinnitus, trouble swallowing and voice change  Eyes: Negative for photophobia, pain, discharge and visual disturbance  Respiratory: Negative for cough, chest tightness, shortness of breath and wheezing  Cardiovascular: Negative for chest pain and palpitations  Gastrointestinal: Negative for abdominal pain, blood in stool, constipation, diarrhea, nausea, rectal pain and vomiting  Endocrine: Negative for cold intolerance, polydipsia, polyphagia and polyuria  Genitourinary: Negative for decreased urine volume, difficulty urinating, dysuria, frequency, hematuria and urgency  Musculoskeletal: Negative for arthralgias, back pain, gait problem, joint swelling, myalgias, neck pain and neck stiffness  Skin: Negative for color change and rash  Allergic/Immunologic: Negative for environmental allergies, food allergies and immunocompromised state  Neurological: Negative for dizziness, seizures, speech difficulty, light-headedness and headaches  Hematological: Negative for adenopathy  Does not bruise/bleed easily     Psychiatric/Behavioral: Negative for behavioral problems, confusion, decreased concentration, self-injury and sleep disturbance  The patient is not nervous/anxious and is not hyperactive         Past Medical History:     Past Medical History:   Diagnosis Date    Cardiac disorder     Mild tricuspid insufficiency       Past Surgical History:     Past Surgical History:   Procedure Laterality Date    ARTHROSCOPIC REPAIR ACL Left 01/12/2018    NO PAST SURGERIES        Social History:        Social History     Socioeconomic History    Marital status: Single     Spouse name: Not on file    Number of children: Not on file    Years of education: Not on file    Highest education level: Not on file   Occupational History    Not on file   Social Needs    Financial resource strain: Not on file    Food insecurity     Worry: Not on file     Inability: Not on file   [employer] needs     Medical: Not on file     Non-medical: Not on file   Tobacco Use    Smoking status: Never Smoker    Smokeless tobacco: Never Used   Substance and Sexual Activity    Alcohol use: No    Drug use: No    Sexual activity: Not Currently     Birth control/protection: OCP   Lifestyle    Physical activity     Days per week: Not on file     Minutes per session: Not on file    Stress: Not on file   Relationships    Social connections     Talks on phone: Not on file     Gets together: Not on file     Attends Jewish service: Not on file     Active member of club or organization: Not on file     Attends meetings of clubs or organizations: Not on file     Relationship status: Not on file    Intimate partner violence     Fear of current or ex partner: Not on file     Emotionally abused: Not on file     Physically abused: Not on file     Forced sexual activity: Not on file   Other Topics Concern    Not on file   Social History Narrative    Always uses seat belt     Caffeine use     Exercising regularly     Seeing a dentist      Family History:     Family History   Problem Relation Age of Onset    Diabetes Family  Hiatal hernia Family     Hypertension Family     No Known Problems Mother     No Known Problems Father     Breast cancer Paternal Grandmother 68    Colon cancer Neg Hx     Ovarian cancer Neg Hx       Current Medications:     Current Outpatient Medications   Medication Sig Dispense Refill    albuterol (VENTOLIN HFA) 90 mcg/act inhaler Take 1 or 2 puffs 15 minutes before physical activity 1 Inhaler 0    norethindrone-ethinyl estradiol (JUNEL FE 1/20) 1-20 MG-MCG per tablet One active tablet po daily, no placebos 112 tablet 2     No current facility-administered medications for this visit  Allergies:     No Known Allergies   Physical Exam:     /72   Pulse 93   Temp 97 9 °F (36 6 °C)   Ht 5' 6 5" (1 689 m)   Wt 62 1 kg (137 lb)   SpO2 99%   BMI 21 78 kg/m²     Physical Exam  Vitals signs and nursing note reviewed  Constitutional:       Appearance: Normal appearance  She is normal weight  HENT:      Head: Normocephalic and atraumatic  Right Ear: External ear normal       Left Ear: External ear normal    Eyes:      Extraocular Movements: Extraocular movements intact  Conjunctiva/sclera: Conjunctivae normal       Pupils: Pupils are equal, round, and reactive to light  Neck:      Musculoskeletal: Normal range of motion  Thyroid: No thyromegaly  Vascular: No carotid bruit  Cardiovascular:      Rate and Rhythm: Normal rate and regular rhythm  Pulses: Normal pulses  Heart sounds: Murmur present  Pulmonary:      Effort: Pulmonary effort is normal       Breath sounds: Normal breath sounds  Abdominal:      General: Abdomen is flat  Bowel sounds are normal       Palpations: Abdomen is soft  There is no mass  Tenderness: There is no abdominal tenderness  There is no right CVA tenderness or left CVA tenderness  Musculoskeletal: Normal range of motion  Right lower leg: No edema  Left lower leg: No edema     Lymphadenopathy:      Cervical: No cervical adenopathy  Skin:     General: Skin is warm and dry  Neurological:      General: No focal deficit present  Mental Status: She is alert and oriented to person, place, and time  Psychiatric:         Mood and Affect: Mood normal          Behavior: Behavior normal          Thought Content:  Thought content normal          Judgment: Judgment normal           DANIEL Trujillo [id number]

## 2020-09-16 ENCOUNTER — CLINICAL SUPPORT (OUTPATIENT)
Dept: FAMILY MEDICINE CLINIC | Facility: CLINIC | Age: 25
End: 2020-09-16

## 2020-09-16 DIAGNOSIS — Z11.1 TUBERCULOSIS SCREENING: Primary | ICD-10-CM

## 2020-09-16 LAB
INDURATION: 0 MM
TB SKIN TEST: NEGATIVE

## 2020-09-16 NOTE — PROGRESS NOTES
Seen for a nurse visit for a PPD read in Right Lower Forearm  Results are 0 0mm negative   Beatriz Carmen
[de-identified] : 80 year old female s/p right total knee replacement presents to the office today for a 3 week follow up. She is here today for staple removal. Pt is ambulating with a cane. She reports taking only Tylenol with good pain control. She reports taking Eliquis for DVT prophylaxis. She continues to do at home physical therapy with good relief and states she will be starting outpatient physical therapy later this week. Pt denies any fevers, drainage from the incision site, chest pain, or shortness of breath.

## 2020-11-07 ENCOUNTER — LAB (OUTPATIENT)
Dept: LAB | Facility: HOSPITAL | Age: 25
End: 2020-11-07
Payer: COMMERCIAL

## 2020-11-07 DIAGNOSIS — Z13.1 SCREENING FOR DIABETES MELLITUS: ICD-10-CM

## 2020-11-07 DIAGNOSIS — Z13.0 SCREENING FOR DEFICIENCY ANEMIA: ICD-10-CM

## 2020-11-07 LAB
ALBUMIN SERPL BCP-MCNC: 3.7 G/DL (ref 3.5–5)
ALP SERPL-CCNC: 53 U/L (ref 46–116)
ALT SERPL W P-5'-P-CCNC: 41 U/L (ref 12–78)
ANION GAP SERPL CALCULATED.3IONS-SCNC: 11 MMOL/L (ref 4–13)
AST SERPL W P-5'-P-CCNC: 20 U/L (ref 5–45)
BILIRUB SERPL-MCNC: 0.4 MG/DL (ref 0.2–1)
BUN SERPL-MCNC: 11 MG/DL (ref 5–25)
CALCIUM SERPL-MCNC: 9.1 MG/DL (ref 8.3–10.1)
CHLORIDE SERPL-SCNC: 105 MMOL/L (ref 100–108)
CO2 SERPL-SCNC: 25 MMOL/L (ref 21–32)
CREAT SERPL-MCNC: 0.77 MG/DL (ref 0.6–1.3)
ERYTHROCYTE [DISTWIDTH] IN BLOOD BY AUTOMATED COUNT: 12.1 % (ref 11.6–15.1)
GFR SERPL CREATININE-BSD FRML MDRD: 108 ML/MIN/1.73SQ M
GLUCOSE P FAST SERPL-MCNC: 90 MG/DL (ref 65–99)
HCT VFR BLD AUTO: 42.6 % (ref 34.8–46.1)
HGB BLD-MCNC: 13.8 G/DL (ref 11.5–15.4)
MCH RBC QN AUTO: 28.6 PG (ref 26.8–34.3)
MCHC RBC AUTO-ENTMCNC: 32.4 G/DL (ref 31.4–37.4)
MCV RBC AUTO: 88 FL (ref 82–98)
PLATELET # BLD AUTO: 433 THOUSANDS/UL (ref 149–390)
PMV BLD AUTO: 9.4 FL (ref 8.9–12.7)
POTASSIUM SERPL-SCNC: 4.3 MMOL/L (ref 3.5–5.3)
PROT SERPL-MCNC: 8 G/DL (ref 6.4–8.2)
RBC # BLD AUTO: 4.82 MILLION/UL (ref 3.81–5.12)
SODIUM SERPL-SCNC: 141 MMOL/L (ref 136–145)
WBC # BLD AUTO: 5.14 THOUSAND/UL (ref 4.31–10.16)

## 2020-11-07 PROCEDURE — 85027 COMPLETE CBC AUTOMATED: CPT

## 2020-11-07 PROCEDURE — 80053 COMPREHEN METABOLIC PANEL: CPT

## 2020-11-07 PROCEDURE — 36415 COLL VENOUS BLD VENIPUNCTURE: CPT

## 2020-11-10 DIAGNOSIS — D75.839 THROMBOCYTOSIS: Primary | ICD-10-CM

## 2020-11-11 ENCOUNTER — TELEPHONE (OUTPATIENT)
Dept: OBGYN CLINIC | Facility: CLINIC | Age: 25
End: 2020-11-11

## 2020-11-28 DIAGNOSIS — Z01.419 ENCOUNTER FOR GYNECOLOGICAL EXAMINATION WITHOUT ABNORMAL FINDING: ICD-10-CM

## 2020-11-30 RX ORDER — NORETHINDRONE ACETATE AND ETHINYL ESTRADIOL 1MG-20(21)
KIT ORAL
Qty: 84 TABLET | Refills: 0 | Status: SHIPPED | OUTPATIENT
Start: 2020-11-30 | End: 2021-01-08 | Stop reason: SDUPTHER

## 2020-12-10 ENCOUNTER — LAB (OUTPATIENT)
Dept: LAB | Facility: HOSPITAL | Age: 25
End: 2020-12-10
Payer: COMMERCIAL

## 2020-12-10 DIAGNOSIS — D75.839 THROMBOCYTOSIS: ICD-10-CM

## 2020-12-10 LAB
ERYTHROCYTE [DISTWIDTH] IN BLOOD BY AUTOMATED COUNT: 12.6 % (ref 11.6–15.1)
HCT VFR BLD AUTO: 42.6 % (ref 34.8–46.1)
HGB BLD-MCNC: 13.5 G/DL (ref 11.5–15.4)
MCH RBC QN AUTO: 28.7 PG (ref 26.8–34.3)
MCHC RBC AUTO-ENTMCNC: 31.7 G/DL (ref 31.4–37.4)
MCV RBC AUTO: 90 FL (ref 82–98)
PLATELET # BLD AUTO: 351 THOUSANDS/UL (ref 149–390)
PMV BLD AUTO: 9.6 FL (ref 8.9–12.7)
RBC # BLD AUTO: 4.71 MILLION/UL (ref 3.81–5.12)
WBC # BLD AUTO: 5.24 THOUSAND/UL (ref 4.31–10.16)

## 2020-12-10 PROCEDURE — 36415 COLL VENOUS BLD VENIPUNCTURE: CPT

## 2020-12-10 PROCEDURE — 85027 COMPLETE CBC AUTOMATED: CPT

## 2020-12-29 ENCOUNTER — TELEPHONE (OUTPATIENT)
Dept: FAMILY MEDICINE CLINIC | Facility: CLINIC | Age: 25
End: 2020-12-29

## 2020-12-29 NOTE — TELEPHONE ENCOUNTER
Patient called stating her insurance says she needs a referral for anything done by someone else other than Evelene Friends states her insurance is denying her blood work because of this

## 2020-12-31 ENCOUNTER — TELEPHONE (OUTPATIENT)
Dept: FAMILY MEDICINE CLINIC | Facility: CLINIC | Age: 25
End: 2020-12-31

## 2020-12-31 DIAGNOSIS — Z01.419 ENCOUNTER FOR ANNUAL ROUTINE GYNECOLOGICAL EXAMINATION: Primary | ICD-10-CM

## 2021-01-05 NOTE — TELEPHONE ENCOUNTER
Referral to GYN  faxed to [facility] [place]  April # [phone], [phone], confirmed 2:39 PM - 01/04/2020- FILE [id number]

## 2021-01-08 ENCOUNTER — ANNUAL EXAM (OUTPATIENT)
Dept: OBGYN CLINIC | Facility: CLINIC | Age: 26
End: 2021-01-08
Payer: COMMERCIAL

## 2021-01-08 VITALS — DIASTOLIC BLOOD PRESSURE: 74 MMHG | SYSTOLIC BLOOD PRESSURE: 120 MMHG | BODY MASS INDEX: 22.26 KG/M2 | WEIGHT: 140 LBS

## 2021-01-08 DIAGNOSIS — Z01.419 ENCNTR FOR GYN EXAM (GENERAL) (ROUTINE) W/O ABN FINDINGS: Primary | ICD-10-CM

## 2021-01-08 DIAGNOSIS — Z01.419 ENCOUNTER FOR GYNECOLOGICAL EXAMINATION WITHOUT ABNORMAL FINDING: ICD-10-CM

## 2021-01-08 DIAGNOSIS — Z01.419 ENCOUNTER FOR ANNUAL ROUTINE GYNECOLOGICAL EXAMINATION: ICD-10-CM

## 2021-01-08 PROBLEM — Z00.00 ANNUAL PHYSICAL EXAM: Status: RESOLVED | Noted: 2019-06-07 | Resolved: 2021-01-08

## 2021-01-08 PROBLEM — Z11.1 TUBERCULOSIS SCREENING: Status: RESOLVED | Noted: 2018-10-05 | Resolved: 2021-01-08

## 2021-01-08 PROCEDURE — G0145 SCR C/V CYTO,THINLAYER,RESCR: HCPCS | Performed by: PHYSICIAN ASSISTANT

## 2021-01-08 PROCEDURE — 99395 PREV VISIT EST AGE 18-39: CPT | Performed by: PHYSICIAN ASSISTANT

## 2021-01-08 PROCEDURE — 1036F TOBACCO NON-USER: CPT | Performed by: PHYSICIAN ASSISTANT

## 2021-01-08 RX ORDER — NORETHINDRONE ACETATE AND ETHINYL ESTRADIOL 1MG-20(21)
KIT ORAL
Qty: 84 TABLET | Refills: 4 | Status: SHIPPED | OUTPATIENT
Start: 2021-01-08 | End: 2021-11-02 | Stop reason: SDUPTHER

## 2021-01-08 NOTE — PROGRESS NOTES
Kristen Tovar  [birth year]    CC:  Yearly exam    S:  26 y o  female here for yearly exam  Her cycles are regular, not heavy or crampy  She got  in August and is very happy  She is sexually active without pain, bleeding or dryness  Contraception:  She uses Junel Fe 1/20 for contraception  We reviewed [facility] guidelines for Pap testing       Last Pap 12/21/17 neg    Family hx of breast cancer: PGM (68)  Family hx of ovarian cancer: no  Family hx of colon cancer: no      Current Outpatient Medications:     albuterol (VENTOLIN HFA) 90 mcg/act inhaler, Take 1 or 2 puffs 15 minutes before physical activity, Disp: 1 Inhaler, Rfl: 0    norethindrone-ethinyl estradiol (Junel FE 1/20) 1-20 MG-MCG per tablet, TAKE 1 TABLET BY MOUTH  DAILY, Disp: 84 tablet, Rfl: 0  Social History     Socioeconomic History    Marital status: Single     Spouse name: Not on file    Number of children: Not on file    Years of education: Not on file    Highest education level: Not on file   Occupational History    Not on file   Social Needs    Financial resource strain: Not on file    Food insecurity     Worry: Not on file     Inability: Not on file    Transportation needs     Medical: Not on file     Non-medical: Not on file   Tobacco Use    Smoking status: Never Smoker    Smokeless tobacco: Never Used   Substance and Sexual Activity    Alcohol use: Yes     Frequency: Never     Drinks per session: 1 or 2     Binge frequency: Never    Drug use: No    Sexual activity: Yes     Partners: Male     Birth control/protection: OCP   Lifestyle    Physical activity     Days per week: Not on file     Minutes per session: Not on file    Stress: Not on file   Relationships    Social connections     Talks on phone: Not on file     Gets together: Not on file     Attends Scientologist service: Not on file     Active member of club or organization: Not on file     Attends meetings of clubs or organizations: Not on file Relationship status: Not on file    Intimate partner violence     Fear of current or ex partner: Not on file     Emotionally abused: Not on file     Physically abused: Not on file     Forced sexual activity: Not on file   Other Topics Concern    Not on file   Social History Narrative    Always uses seat belt     Caffeine use     Exercising regularly     Seeing a dentist     Family History   Problem Relation Age of Onset    Diabetes Family     Hiatal hernia Family     Hypertension Family     No Known Problems Mother     No Known Problems Father     Breast cancer Paternal Grandmother 68    Colon cancer Neg Hx     Ovarian cancer Neg Hx      Past Medical History:   Diagnosis Date    Cardiac disorder     Mild tricuspid insufficiency        Review of Systems   Respiratory: Negative  Cardiovascular: Negative  Gastrointestinal: Negative for constipation and diarrhea  Genitourinary: Negative for difficulty urinating, pelvic pain, vaginal bleeding, vaginal discharge, itching or odor  O:  Blood pressure 120/74, weight 63 5 kg (140 lb), last menstrual period 01/04/2021, not currently breastfeeding  Patient appears well and is not in distress  Neck is supple without masses  Breasts are symmetrical without mass, tenderness, nipple discharge, skin changes or adenopathy  Abdomen is soft and nontender without masses  External genitals are normal without lesions or rashes  Urethra and urethral meatus are normal  Bladder is normal to palpation  Vagina is normal without discharge or bleeding  Cervix is normal without discharge or lesion  Uterus is normal, mobile, nontender without palpable mass  Adnexa are normal, nontender, without palpable mass  A:  Yearly exam      P:   Pap with reflex HPV today    junel fe 1/20 sent to mail order    RTO one year for yearly exam or sooner as needed

## 2021-01-13 LAB
LAB AP GYN PRIMARY INTERPRETATION: NORMAL
Lab: NORMAL

## 2021-02-01 ENCOUNTER — PATIENT MESSAGE (OUTPATIENT)
Dept: FAMILY MEDICINE CLINIC | Facility: CLINIC | Age: 26
End: 2021-02-01

## 2021-02-01 DIAGNOSIS — Z01.00 EYE EXAM, ROUTINE: Primary | ICD-10-CM

## 2021-02-03 DIAGNOSIS — Z01.00 ENCOUNTER FOR COMPLETE EYE EXAM: Primary | ICD-10-CM

## 2021-02-15 ENCOUNTER — OFFICE VISIT (OUTPATIENT)
Dept: FAMILY MEDICINE CLINIC | Facility: CLINIC | Age: 26
End: 2021-02-15
Payer: COMMERCIAL

## 2021-02-15 VITALS
WEIGHT: 143 LBS | SYSTOLIC BLOOD PRESSURE: 114 MMHG | BODY MASS INDEX: 22.44 KG/M2 | TEMPERATURE: 97.5 F | HEIGHT: 67 IN | DIASTOLIC BLOOD PRESSURE: 72 MMHG | HEART RATE: 77 BPM | OXYGEN SATURATION: 98 %

## 2021-02-15 DIAGNOSIS — L50.9 URTICARIA OF UNKNOWN ORIGIN: Primary | ICD-10-CM

## 2021-02-15 PROCEDURE — 99213 OFFICE O/P EST LOW 20 MIN: CPT | Performed by: PHYSICIAN ASSISTANT

## 2021-02-15 PROCEDURE — 3725F SCREEN DEPRESSION PERFORMED: CPT | Performed by: PHYSICIAN ASSISTANT

## 2021-02-15 PROCEDURE — 3008F BODY MASS INDEX DOCD: CPT | Performed by: PHYSICIAN ASSISTANT

## 2021-02-15 RX ORDER — LORATADINE 10 MG/1
10 TABLET ORAL DAILY
Qty: 30 TABLET
Start: 2021-02-15

## 2021-02-15 RX ORDER — MONTELUKAST SODIUM 10 MG/1
10 TABLET ORAL
Qty: 30 TABLET | Refills: 1 | Status: SHIPPED | OUTPATIENT
Start: 2021-02-15 | End: 2022-01-04 | Stop reason: ALTCHOICE

## 2021-02-15 RX ORDER — DIPHENHYDRAMINE HCL 50 MG
50 CAPSULE ORAL EVERY 6 HOURS PRN
Qty: 30 CAPSULE | Refills: 0
Start: 2021-02-15 | End: 2022-01-04 | Stop reason: ALTCHOICE

## 2021-02-15 NOTE — PROGRESS NOTES
Assessment/Plan:          Diagnoses and all orders for this visit:    Urticaria of unknown origin  -     montelukast (SINGULAIR) 10 mg tablet; Take 1 tablet (10 mg total) by mouth daily at bedtime  -     loratadine (CLARITIN) 10 mg tablet; Take 1 tablet (10 mg total) by mouth daily  -     diphenhydrAMINE (BENADRYL) 50 mg capsule; Take 1 capsule (50 mg total) by mouth every 6 (six) hours as needed for itching            Subjective:      Patient ID: Abhinav Peter is a 26 y o  female  Rash  This is a new problem  The current episode started 1 to 4 weeks ago  The problem has been gradually worsening since onset  The rash is diffuse  The rash is characterized by itchiness, redness and swelling  She was exposed to nothing  Pertinent negatives include no congestion, cough, eye pain, fatigue, fever, joint pain, nail changes, shortness of breath or sore throat  Past treatments include anti-itch cream, antihistamine and topical steroids  The treatment provided no relief  The following portions of the patient's history were reviewed and updated as appropriate:   She has a past medical history of Cardiac disorder  ,  does not have any pertinent problems on file  ,   has a past surgical history that includes No past surgeries and Arthroscopic repair ACL (Left, 01/12/2018)  ,  family history includes Breast cancer (age of onset: 68) in her paternal grandmother; Diabetes in her family; Hiatal hernia in her family; Hypertension in her family; No Known Problems in her father and mother  ,   reports that she has never smoked  She has never used smokeless tobacco  She reports current alcohol use  She reports that she does not use drugs  ,  has No Known Allergies     Current Outpatient Medications   Medication Sig Dispense Refill    albuterol (VENTOLIN HFA) 90 mcg/act inhaler Take 1 or 2 puffs 15 minutes before physical activity 1 Inhaler 0    norethindrone-ethinyl estradiol (Junel FE 1/20) 1-20 MG-MCG per tablet TAKE 1 TABLET BY MOUTH  DAILY 84 tablet 4    diphenhydrAMINE (BENADRYL) 50 mg capsule Take 1 capsule (50 mg total) by mouth every 6 (six) hours as needed for itching 30 capsule 0    loratadine (CLARITIN) 10 mg tablet Take 1 tablet (10 mg total) by mouth daily 30 tablet     montelukast (SINGULAIR) 10 mg tablet Take 1 tablet (10 mg total) by mouth daily at bedtime 30 tablet 1     No current facility-administered medications for this visit  Review of Systems   Constitutional: Negative for activity change, appetite change, fatigue and fever  HENT: Negative for congestion, postnasal drip, sneezing, sore throat and trouble swallowing  Eyes: Negative for pain  Respiratory: Negative for cough, chest tightness and shortness of breath  Cardiovascular: Negative for chest pain and palpitations  Gastrointestinal: Negative for abdominal pain  Endocrine: Negative for cold intolerance and heat intolerance  Musculoskeletal: Negative for joint pain  Skin: Positive for rash  Negative for nail changes  Allergic/Immunologic: Negative for environmental allergies and food allergies  Neurological: Negative for dizziness, light-headedness and headaches  Objective:  Vitals:    02/15/21 1533   BP: 114/72   BP Location: Left arm   Patient Position: Sitting   Pulse: 77   Temp: 97 5 °F (36 4 °C)   TempSrc: Tympanic   SpO2: 98%   Weight: 64 9 kg (143 lb)   Height: 5' 6 5" (1 689 m)     Body mass index is 22 74 kg/m²  Physical Exam  Vitals signs and nursing note reviewed  Constitutional:       Appearance: Normal appearance  HENT:      Right Ear: External ear normal       Left Ear: External ear normal    Eyes:      Conjunctiva/sclera: Conjunctivae normal    Neck:      Musculoskeletal: Normal range of motion and neck supple  Cardiovascular:      Rate and Rhythm: Normal rate  Pulmonary:      Effort: Pulmonary effort is normal    Musculoskeletal:      Right lower leg: No edema        Left lower leg: No edema    Skin:     General: Skin is warm and dry  Findings: Rash present  Rash is urticarial    Neurological:      Mental Status: She is alert and oriented to person, place, and time     Psychiatric:         Mood and Affect: Mood normal          Behavior: Behavior normal

## 2021-08-06 DIAGNOSIS — Z01.419 ENCOUNTER FOR GYNECOLOGICAL EXAMINATION WITHOUT ABNORMAL FINDING: ICD-10-CM

## 2021-08-09 RX ORDER — NORETHINDRONE ACETATE AND ETHINYL ESTRADIOL 1MG-20(21)
KIT ORAL
Qty: 84 TABLET | Refills: 3 | OUTPATIENT
Start: 2021-08-09

## 2021-08-24 ENCOUNTER — CLINICAL SUPPORT (OUTPATIENT)
Dept: FAMILY MEDICINE CLINIC | Facility: CLINIC | Age: 26
End: 2021-08-24
Payer: COMMERCIAL

## 2021-08-24 DIAGNOSIS — Z11.1 TUBERCULOSIS SCREENING: Primary | ICD-10-CM

## 2021-08-24 PROCEDURE — 86580 TB INTRADERMAL TEST: CPT

## 2021-08-26 ENCOUNTER — CLINICAL SUPPORT (OUTPATIENT)
Dept: FAMILY MEDICINE CLINIC | Facility: CLINIC | Age: 26
End: 2021-08-26

## 2021-08-26 DIAGNOSIS — Z11.1 TUBERCULOSIS SCREENING: Primary | ICD-10-CM

## 2021-08-26 LAB
INDURATION: 0 MM
TB SKIN TEST: NEGATIVE

## 2021-08-26 NOTE — PROGRESS NOTES
Pt presents today for a PPD reading  Pt's PPD skin test was placed in the right lower forearm  PPD skin test is negative  Induration 0 0 mm

## 2021-11-02 DIAGNOSIS — Z01.419 ENCOUNTER FOR GYNECOLOGICAL EXAMINATION WITHOUT ABNORMAL FINDING: ICD-10-CM

## 2021-11-02 RX ORDER — NORETHINDRONE ACETATE AND ETHINYL ESTRADIOL 1MG-20(21)
KIT ORAL
Qty: 84 TABLET | Refills: 0 | Status: SHIPPED | OUTPATIENT
Start: 2021-11-02 | End: 2022-02-07 | Stop reason: SDUPTHER

## 2022-01-04 ENCOUNTER — TELEMEDICINE (OUTPATIENT)
Dept: FAMILY MEDICINE CLINIC | Facility: CLINIC | Age: 27
End: 2022-01-04
Payer: COMMERCIAL

## 2022-01-04 VITALS — BODY MASS INDEX: 20.56 KG/M2 | WEIGHT: 131 LBS | HEIGHT: 67 IN

## 2022-01-04 DIAGNOSIS — Z20.822 EXPOSURE TO COVID-19 VIRUS: ICD-10-CM

## 2022-01-04 DIAGNOSIS — B34.9 VIRAL INFECTION, UNSPECIFIED: ICD-10-CM

## 2022-01-04 PROBLEM — M25.569 KNEE PAIN: Status: ACTIVE | Noted: 2022-01-04

## 2022-01-04 PROCEDURE — 99213 OFFICE O/P EST LOW 20 MIN: CPT | Performed by: PHYSICIAN ASSISTANT

## 2022-01-04 PROCEDURE — U0003 INFECTIOUS AGENT DETECTION BY NUCLEIC ACID (DNA OR RNA); SEVERE ACUTE RESPIRATORY SYNDROME CORONAVIRUS 2 (SARS-COV-2) (CORONAVIRUS DISEASE [COVID-19]), AMPLIFIED PROBE TECHNIQUE, MAKING USE OF HIGH THROUGHPUT TECHNOLOGIES AS DESCRIBED BY CMS-2020-01-R: HCPCS | Performed by: PHYSICIAN ASSISTANT

## 2022-01-04 PROCEDURE — U0005 INFEC AGEN DETEC AMPLI PROBE: HCPCS | Performed by: PHYSICIAN ASSISTANT

## 2022-01-04 NOTE — PROGRESS NOTES
COVID-19 Outpatient Progress Note    Assessment/Plan:    Problem List Items Addressed This Visit     None      Visit Diagnoses     Exposure to COVID-19 virus        Relevant Orders    COVID Only - Office Collect    Viral infection, unspecified        Relevant Orders    COVID Only - Office Collect         Disposition:     Recommended patient to come to the office to test for COVID-19  I have spent 8 minutes directly with the patient  Greater than 50% of this time was spent in counseling/coordination of care regarding: prognosis, risks and benefits of treatment options, instructions for management, patient and family education, importance of treatment compliance, risk factor reductions and impressions  Encounter provider Arnoldo Penaloza PA-C    Provider located at [facility] [address] [facility]  [address]  [phone]    Recent Visits  No visits were found meeting these conditions  Showing recent visits within past 7 days and meeting all other requirements  Today's Visits  Date Type Provider Dept   01/04/22 Telemedicine [facility] [address] today's visits and meeting all other requirements  Future Appointments  No visits were found meeting these conditions  Showing future appointments within next 150 days and meeting all other requirements     This virtual check-in was done via [employer] [address] and patient was informed that this is a secure, HIPAA-compliant platform  She agrees to proceed  Patient agrees to participate in a virtual check in via telephone or video visit instead of presenting to the office to address urgent/immediate medical needs  Patient is aware this is a billable service  After connecting through [facility], the patient was identified by name and date of birth   Coty Lund was informed that this was a telemedicine visit and that the exam was being conducted confidentially over secure lines  My office door was closed  No one else was in the room  Kristen Gusman Neither acknowledged consent and understanding of privacy and security of the telemedicine visit  I informed the patient that I have reviewed her record in Epic and presented the opportunity for her to ask any questions regarding the visit today  The patient agreed to participate  Verification of patient location:  Patient is located in the following state in which I hold an active license: [place]    Subjective:   Coty Lund is a 23 y o  female who is concerned about COVID-19  Patient's symptoms include fatigue, nasal congestion, sore throat, cough, chest tightness and headache  Patient denies fever, chills, rhinorrhea, anosmia, loss of taste, shortness of breath, nausea, diarrhea and myalgias  Date of symptom onset: 1/3/2022  Date of exposure: 12/31/2021  COVID-19 vaccination status: Not vaccinated    Lab Results   Component Value Date    SARSCOV2 Negative 08/22/2021     Past Medical History:   Diagnosis Date    Cardiac disorder     Mild tricuspid insufficiency      Past Surgical History:   Procedure Laterality Date    ARTHROSCOPIC REPAIR ACL Left 01/12/2018    NO PAST SURGERIES       Current Outpatient Medications   Medication Sig Dispense Refill    albuterol (VENTOLIN HFA) 90 mcg/act inhaler Take 1 or 2 puffs 15 minutes before physical activity 1 Inhaler 0    loratadine (CLARITIN) 10 mg tablet Take 1 tablet (10 mg total) by mouth daily 30 tablet     norethindrone-ethinyl estradiol (Junel FE 1/20) 1-20 MG-MCG per tablet TAKE 1 TABLET BY MOUTH  DAILY 84 tablet 0     No current facility-administered medications for this visit  No Known Allergies    Review of Systems   Constitutional: Positive for fatigue  Negative for chills and fever  HENT: Positive for congestion and sore throat  Negative for rhinorrhea      Respiratory: Positive for cough and chest tightness  Negative for shortness of breath  Gastrointestinal: Negative for diarrhea and nausea  Musculoskeletal: Negative for myalgias  Neurological: Positive for headaches  Objective:    Vitals:    01/04/22 0934   Weight: 59 4 kg (131 lb)   Height: 5' 6 5" (1 689 m)       Physical Exam  Vitals and nursing note reviewed  Constitutional:       Appearance: Normal appearance  She is well-developed  HENT:      Head: Normocephalic and atraumatic  Eyes:      Conjunctiva/sclera: Conjunctivae normal    Pulmonary:      Effort: Pulmonary effort is normal    Musculoskeletal:      Cervical back: Normal range of motion  Neurological:      Mental Status: She is alert and oriented to person, place, and time  Psychiatric:         Mood and Affect: Mood normal          Behavior: Behavior normal          VIRTUAL VISIT DISCLAIMER    Kristen Gomez verbally agrees to participate in [employer]  Pt is aware that [employer] could be limited without vital signs or the ability to perform a full hands-on physical Bj  understands she or the provider may request at any time to terminate the video visit and request the patient to seek care or treatment in person

## 2022-01-05 LAB — SARS-COV-2 RNA RESP QL NAA+PROBE: POSITIVE

## 2022-01-15 ENCOUNTER — APPOINTMENT (EMERGENCY)
Dept: CT IMAGING | Facility: HOSPITAL | Age: 27
End: 2022-01-15
Payer: COMMERCIAL

## 2022-01-15 ENCOUNTER — HOSPITAL ENCOUNTER (EMERGENCY)
Facility: HOSPITAL | Age: 27
Discharge: HOME | End: 2022-01-15
Attending: EMERGENCY MEDICINE | Admitting: EMERGENCY MEDICINE
Payer: COMMERCIAL

## 2022-01-15 VITALS
SYSTOLIC BLOOD PRESSURE: 135 MMHG | DIASTOLIC BLOOD PRESSURE: 81 MMHG | RESPIRATION RATE: 18 BRPM | OXYGEN SATURATION: 98 % | TEMPERATURE: 97.9 F | HEART RATE: 81 BPM

## 2022-01-15 DIAGNOSIS — R11.2 NON-INTRACTABLE VOMITING WITH NAUSEA, UNSPECIFIED VOMITING TYPE: ICD-10-CM

## 2022-01-15 DIAGNOSIS — R10.13 EPIGASTRIC PAIN: Primary | ICD-10-CM

## 2022-01-15 LAB
ALBUMIN SERPL BCP-MCNC: 3.8 G/DL (ref 3.5–5)
ALP SERPL-CCNC: 63 U/L (ref 46–116)
ALT SERPL W P-5'-P-CCNC: 29 U/L (ref 12–78)
ANION GAP SERPL CALCULATED.3IONS-SCNC: 10 MMOL/L (ref 4–13)
AST SERPL W P-5'-P-CCNC: 15 U/L (ref 5–45)
BASOPHILS # BLD AUTO: 0.04 THOUSANDS/ΜL (ref 0–0.1)
BASOPHILS NFR BLD AUTO: 1 % (ref 0–1)
BILIRUB SERPL-MCNC: 0.86 MG/DL (ref 0.2–1)
BILIRUB UR QL STRIP: NEGATIVE
BUN SERPL-MCNC: 11 MG/DL (ref 5–25)
CALCIUM SERPL-MCNC: 9.2 MG/DL (ref 8.3–10.1)
CHLORIDE SERPL-SCNC: 100 MMOL/L (ref 100–108)
CLARITY UR: CLEAR
CO2 SERPL-SCNC: 27 MMOL/L (ref 21–32)
COLOR UR: YELLOW
CREAT SERPL-MCNC: 0.76 MG/DL (ref 0.6–1.3)
EOSINOPHIL # BLD AUTO: 0.39 THOUSAND/ΜL (ref 0–0.61)
EOSINOPHIL NFR BLD AUTO: 5 % (ref 0–6)
ERYTHROCYTE [DISTWIDTH] IN BLOOD BY AUTOMATED COUNT: 11.9 % (ref 11.6–15.1)
EXT PREG TEST URINE: NEGATIVE
EXT. CONTROL ED NAV: NORMAL
GFR SERPL CREATININE-BSD FRML MDRD: 109 ML/MIN/1.73SQ M
GLUCOSE SERPL-MCNC: 97 MG/DL (ref 65–140)
GLUCOSE UR STRIP-MCNC: NEGATIVE MG/DL
HCT VFR BLD AUTO: 42.8 % (ref 34.8–46.1)
HGB BLD-MCNC: 14.2 G/DL (ref 11.5–15.4)
HGB UR QL STRIP.AUTO: NEGATIVE
IMM GRANULOCYTES # BLD AUTO: 0.02 THOUSAND/UL (ref 0–0.2)
IMM GRANULOCYTES NFR BLD AUTO: 0 % (ref 0–2)
KETONES UR STRIP-MCNC: NEGATIVE MG/DL
LEUKOCYTE ESTERASE UR QL STRIP: NEGATIVE
LIPASE SERPL-CCNC: 83 U/L (ref 73–393)
LYMPHOCYTES # BLD AUTO: 1.7 THOUSANDS/ΜL (ref 0.6–4.47)
LYMPHOCYTES NFR BLD AUTO: 20 % (ref 14–44)
MCH RBC QN AUTO: 29 PG (ref 26.8–34.3)
MCHC RBC AUTO-ENTMCNC: 33.2 G/DL (ref 31.4–37.4)
MCV RBC AUTO: 88 FL (ref 82–98)
MONOCYTES # BLD AUTO: 0.57 THOUSAND/ΜL (ref 0.17–1.22)
MONOCYTES NFR BLD AUTO: 7 % (ref 4–12)
NEUTROPHILS # BLD AUTO: 5.75 THOUSANDS/ΜL (ref 1.85–7.62)
NEUTS SEG NFR BLD AUTO: 67 % (ref 43–75)
NITRITE UR QL STRIP: NEGATIVE
NRBC BLD AUTO-RTO: 0 /100 WBCS
PH UR STRIP.AUTO: 6 [PH]
PLATELET # BLD AUTO: 369 THOUSANDS/UL (ref 149–390)
PMV BLD AUTO: 10.1 FL (ref 8.9–12.7)
POTASSIUM SERPL-SCNC: 4 MMOL/L (ref 3.5–5.3)
PROT SERPL-MCNC: 7.9 G/DL (ref 6.4–8.2)
PROT UR STRIP-MCNC: NEGATIVE MG/DL
RBC # BLD AUTO: 4.89 MILLION/UL (ref 3.81–5.12)
SODIUM SERPL-SCNC: 137 MMOL/L (ref 136–145)
SP GR UR STRIP.AUTO: 1.02 (ref 1–1.03)
UROBILINOGEN UR QL STRIP.AUTO: 0.2 E.U./DL
WBC # BLD AUTO: 8.47 THOUSAND/UL (ref 4.31–10.16)

## 2022-01-15 PROCEDURE — 80053 COMPREHEN METABOLIC PANEL: CPT | Performed by: PHYSICIAN ASSISTANT

## 2022-01-15 PROCEDURE — 96374 THER/PROPH/DIAG INJ IV PUSH: CPT

## 2022-01-15 PROCEDURE — 83690 ASSAY OF LIPASE: CPT | Performed by: PHYSICIAN ASSISTANT

## 2022-01-15 PROCEDURE — 36415 COLL VENOUS BLD VENIPUNCTURE: CPT | Performed by: PHYSICIAN ASSISTANT

## 2022-01-15 PROCEDURE — 96361 HYDRATE IV INFUSION ADD-ON: CPT

## 2022-01-15 PROCEDURE — 85025 COMPLETE CBC W/AUTO DIFF WBC: CPT | Performed by: PHYSICIAN ASSISTANT

## 2022-01-15 PROCEDURE — 81003 URINALYSIS AUTO W/O SCOPE: CPT | Performed by: PHYSICIAN ASSISTANT

## 2022-01-15 PROCEDURE — 81025 URINE PREGNANCY TEST: CPT | Performed by: PHYSICIAN ASSISTANT

## 2022-01-15 PROCEDURE — 99284 EMERGENCY DEPT VISIT MOD MDM: CPT | Performed by: PHYSICIAN ASSISTANT

## 2022-01-15 PROCEDURE — 99284 EMERGENCY DEPT VISIT MOD MDM: CPT

## 2022-01-15 PROCEDURE — 74177 CT ABD & PELVIS W/CONTRAST: CPT

## 2022-01-15 PROCEDURE — G1004 CDSM NDSC: HCPCS

## 2022-01-15 RX ORDER — ONDANSETRON 4 MG/1
4 TABLET, ORALLY DISINTEGRATING ORAL EVERY 6 HOURS PRN
Qty: 20 TABLET | Refills: 0 | Status: SHIPPED | OUTPATIENT
Start: 2022-01-15

## 2022-01-15 RX ORDER — PANTOPRAZOLE SODIUM 20 MG/1
20 TABLET, DELAYED RELEASE ORAL DAILY
Qty: 20 TABLET | Refills: 0 | Status: SHIPPED | OUTPATIENT
Start: 2022-01-15 | End: 2022-01-19 | Stop reason: SDUPTHER

## 2022-01-15 RX ORDER — SUCRALFATE 1 G/1
1 TABLET ORAL 4 TIMES DAILY
Qty: 80 TABLET | Refills: 0 | Status: SHIPPED | OUTPATIENT
Start: 2022-01-15 | End: 2022-02-07 | Stop reason: ALTCHOICE

## 2022-01-15 RX ORDER — ONDANSETRON 2 MG/ML
4 INJECTION INTRAMUSCULAR; INTRAVENOUS ONCE
Status: COMPLETED | OUTPATIENT
Start: 2022-01-15 | End: 2022-01-15

## 2022-01-15 RX ADMIN — IOHEXOL 100 ML: 350 INJECTION, SOLUTION INTRAVENOUS at 02:28

## 2022-01-15 RX ADMIN — SODIUM CHLORIDE 1000 ML: 0.9 INJECTION, SOLUTION INTRAVENOUS at 01:20

## 2022-01-15 RX ADMIN — ONDANSETRON 4 MG: 2 INJECTION INTRAMUSCULAR; INTRAVENOUS at 01:20

## 2022-01-15 NOTE — ED PROVIDER NOTES
History  Chief Complaint   Patient presents with    Abdominal Pain     Pt c/o mid-centralized ABD pain since this afternoon as well as N/V/D  Patient is a 42-year-old female with no significant past medical history who presents to the emergency department for evaluation of epigastric abdominal pain described as a burning sensation, nausea, vomiting, diarrhea  Patient was diagnosed with COVID on 01/04/2022  She states that her symptoms started earlier this morning after eating a granola bar  She does have history of GERD  She is not having any fevers, chills, chest pain, difficulty breathing  No other complaints at this time  Prior to Admission Medications   Prescriptions Last Dose Informant Patient Reported? Taking? albuterol (VENTOLIN HFA) 90 mcg/act inhaler  Self No Yes   Sig: Take 1 or 2 puffs 15 minutes before physical activity   loratadine (CLARITIN) 10 mg tablet   No Yes   Sig: Take 1 tablet (10 mg total) by mouth daily   norethindrone-ethinyl estradiol (Junel FE 1/20) 1-20 MG-MCG per tablet   No Yes   Sig: TAKE 1 TABLET BY MOUTH  DAILY      Facility-Administered Medications: None       Past Medical History:   Diagnosis Date    Cardiac disorder     Mild tricuspid insufficiency        Past Surgical History:   Procedure Laterality Date    ARTHROSCOPIC REPAIR ACL Left 01/12/2018    NO PAST SURGERIES         Family History   Problem Relation Age of Onset    Diabetes Family     Hiatal hernia Family     Hypertension Family     No Known Problems Mother     No Known Problems Father     Breast cancer Paternal Grandmother 68    Colon cancer Neg Hx     Ovarian cancer Neg Hx      I have reviewed and agree with the history as documented      E-Cigarette/Vaping    E-Cigarette Use Never User      E-Cigarette/Vaping Substances    Nicotine No     THC No     CBD No     Flavoring No     Other No     Unknown No      Social History     Tobacco Use    Smoking status: Never Smoker    Smokeless tobacco: Never Used   Vaping Use    Vaping Use: Never used   Substance Use Topics    Alcohol use: Yes    Drug use: No       Review of Systems   Constitutional: Negative for chills, diaphoresis and fever  HENT: Negative for congestion, facial swelling, nosebleeds, sore throat and voice change  Eyes: Negative for pain and redness  Respiratory: Negative for cough, choking, chest tightness, shortness of breath and stridor  Cardiovascular: Negative for chest pain and palpitations  Gastrointestinal: Positive for abdominal pain, diarrhea, nausea and vomiting  Genitourinary: Negative for difficulty urinating, dysuria, flank pain, hematuria, vaginal bleeding and vaginal discharge  Musculoskeletal: Negative for arthralgias, back pain, myalgias, neck pain and neck stiffness  Skin: Negative for color change and rash  Neurological: Negative for dizziness, syncope, facial asymmetry, weakness, light-headedness, numbness and headaches  Psychiatric/Behavioral: Negative for confusion and suicidal ideas  All other systems reviewed and are negative  Physical Exam  Physical Exam  Vitals reviewed  Constitutional:       General: She is not in acute distress  Appearance: Normal appearance  She is not ill-appearing, toxic-appearing or diaphoretic  HENT:      Head: Normocephalic and atraumatic  Right Ear: External ear normal       Left Ear: External ear normal       Nose: Nose normal  No congestion or rhinorrhea  Mouth/Throat:      Mouth: Mucous membranes are moist       Pharynx: Oropharynx is clear  No oropharyngeal exudate or posterior oropharyngeal erythema  Eyes:      General: No scleral icterus  Right eye: No discharge  Left eye: No discharge  Extraocular Movements: Extraocular movements intact  Conjunctiva/sclera: Conjunctivae normal       Pupils: Pupils are equal, round, and reactive to light     Cardiovascular:      Rate and Rhythm: Normal rate and regular rhythm  Pulses: Normal pulses  Heart sounds: Normal heart sounds  No murmur heard  No friction rub  No gallop  Pulmonary:      Effort: Pulmonary effort is normal  No respiratory distress  Breath sounds: Normal breath sounds  No stridor  No wheezing, rhonchi or rales  Abdominal:      General: Abdomen is flat  Palpations: Abdomen is soft  Tenderness: There is abdominal tenderness in the epigastric area  There is no guarding or rebound  Musculoskeletal:      Cervical back: Normal range of motion and neck supple  Right lower leg: No edema  Left lower leg: No edema  Skin:     General: Skin is warm and dry  Capillary Refill: Capillary refill takes less than 2 seconds  Neurological:      General: No focal deficit present  Mental Status: She is alert and oriented to person, place, and time     Psychiatric:         Mood and Affect: Mood normal          Behavior: Behavior normal          Vital Signs  ED Triage Vitals [01/15/22 0030]   Temperature Pulse Respirations Blood Pressure SpO2   97 9 °F (36 6 °C) 81 18 135/81 98 %      Temp Source Heart Rate Source Patient Position - Orthostatic VS BP Location FiO2 (%)   Temporal Monitor Sitting Left arm --      Pain Score       --           Vitals:    01/15/22 0030   BP: 135/81   Pulse: 81   Patient Position - Orthostatic VS: Sitting         Visual Acuity      ED Medications  Medications   sodium chloride 0 9 % bolus 1,000 mL (0 mL Intravenous Stopped 1/15/22 0221)   ondansetron (ZOFRAN) injection 4 mg (4 mg Intravenous Given 1/15/22 0120)   iohexol (OMNIPAQUE) 350 MG/ML injection (MULTI-DOSE) 100 mL (100 mL Intravenous Given 1/15/22 0228)       Diagnostic Studies  Results Reviewed     Procedure Component Value Units Date/Time    UA w Reflex to Microscopic w Reflex to Culture [[id number]] Collected: 01/15/22 0212    Lab Status: Final result Specimen: Urine, Clean Catch Updated: 01/15/22 0220     Color, UA Yellow Clarity, UA Clear     Specific [place], UA 1 020     pH, UA 6 0     Leukocytes, UA Negative     Nitrite, UA Negative     Protein, UA Negative mg/dl      Glucose, UA Negative mg/dl      Ketones, UA Negative mg/dl      Urobilinogen, UA 0 2 E U /dl      Bilirubin, UA Negative     Blood, UA Negative    POCT pregnancy, urine [[id number]]  (Normal) Resulted: 01/15/22 0214    Lab Status: Final result Updated: 01/15/22 0214     EXT PREG TEST UR (Ref: Negative) negative     Control valid    Comprehensive metabolic panel [[id number]] Collected: 01/15/22 0116    Lab Status: Final result Specimen: Blood from Arm, Right Updated: 01/15/22 0140     Sodium 137 mmol/L      Potassium 4 0 mmol/L      Chloride 100 mmol/L      CO2 27 mmol/L      ANION GAP 10 mmol/L      BUN 11 mg/dL      Creatinine 0 76 mg/dL      Glucose 97 mg/dL      Calcium 9 2 mg/dL      AST 15 U/L      ALT 29 U/L      Alkaline Phosphatase 63 U/L      Total Protein 7 9 g/dL      Albumin 3 8 g/dL      Total Bilirubin 0 86 mg/dL      eGFR 109 ml/min/1 73sq m     Narrative:      [place] guidelines for Chronic Kidney Disease (CKD):     Stage 1 with normal or high GFR (GFR > 90 mL/min/1 73 square meters)    Stage 2 Mild CKD (GFR = 60-89 mL/min/1 73 square meters)    Stage 3A Moderate CKD (GFR = 45-59 mL/min/1 73 square meters)    Stage 3B Moderate CKD (GFR = 30-44 mL/min/1 73 square meters)    Stage 4 Severe CKD (GFR = 15-29 mL/min/1 73 square meters)    Stage 5 End Stage CKD (GFR <15 mL/min/1 73 square meters)  Note: GFR calculation is accurate only with a steady state creatinine    Lipase [[id number]]  (Normal) Collected: 01/15/22 0116    Lab Status: Final result Specimen: Blood from Arm, Right Updated: 01/15/22 0140     Lipase 83 u/L     CBC and differential [[id number]] Collected: 01/15/22 0116    Lab Status: Final result Specimen: Blood from Arm, Right Updated: 01/15/22 0127     WBC 8 47 Thousand/uL      RBC 4 89 Million/uL Hemoglobin 14 2 g/dL      Hematocrit 42 8 %      MCV 88 fL      MCH 29 0 pg      MCHC 33 2 g/dL      RDW 11 9 %      MPV 10 1 fL      Platelets 361 Thousands/uL      nRBC 0 /100 WBCs      Neutrophils Relative 67 %      Immat GRANS % 0 %      Lymphocytes Relative 20 %      Monocytes Relative 7 %      Eosinophils Relative 5 %      Basophils Relative 1 %      Neutrophils Absolute 5 75 Thousands/µL      Immature Grans Absolute 0 02 Thousand/uL      Lymphocytes Absolute 1 70 Thousands/µL      Monocytes Absolute 0 57 Thousand/µL      Eosinophils Absolute 0 39 Thousand/µL      Basophils Absolute 0 04 Thousands/µL                  CT abdomen pelvis with contrast   Final Result by Lubna Perez MD (01/15 0964)      No acute inflammatory process identified within the abdomen or pelvis  Workstation performed: [id number]                    Procedures  Procedures         ED Course                               SBIRT 22yo+      Most Recent Value   SBIRT (22 yo +)    In order to provide better care to our patients, we are screening all of our patients for alcohol and drug use  Would it be okay to ask you these screening questions? Yes Filed at: 01/15/2022 0221   Initial Alcohol Screen: US AUDIT-C     1  How often do you have a drink containing alcohol? 0 Filed at: 01/15/2022 0221   2  How many drinks containing alcohol do you have on a typical day you are drinking? 0 Filed at: 01/15/2022 0221   3b  FEMALE Any Age, or MALE 65+: How often do you have 4 or more drinks on one occassion? 0 Filed at: 01/15/2022 0221   Audit-C Score 0 Filed at: 01/15/2022 0221   DEBBY: How many times in the past year have you    Used an illegal drug or used a prescription medication for non-medical reasons?  Never Filed at: 01/15/2022 0221                    MDM  Number of Diagnoses or Management Options  Epigastric pain  Non-intractable vomiting with nausea, unspecified vomiting type  Diagnosis management comments: Patient presenting for evaluation epigastric abdominal pain, nausea, vomiting, diarrhea that started earlier in the morning  She appears comfortable bed  She does not appear to be in any acute distress  Vital signs unremarkable  On exam, she does have a moderate amount of epigastric abdominal tenderness  No other concerning findings on exam   Lab work completely unremarkable  Urinalysis not concerning for urinary tract infection  CT of the abdomen pelvis did not reveal any acute intra-abdominal pathology  Patient's symptoms likely secondary to viral gastroenteritis, COVID infection, possible peptic ulcer disease  Will initiate Protonix and Carafate to treat for possible ulcer  Patient was given instructions to follow-up with gastroenterology for possible endoscopy  She was given very strict instructions on when to return to the emergency department  Patient stable at this time  Amount and/or Complexity of Data Reviewed  Clinical lab tests: ordered and reviewed  Tests in the radiology section of CPT®: ordered and reviewed    Patient Progress  Patient progress: stable      Disposition  Final diagnoses:   Epigastric pain   Non-intractable vomiting with nausea, unspecified vomiting type     Time reflects when diagnosis was documented in both MDM as applicable and the Disposition within this note     Time User Action Codes Description Comment    1/15/2022  3:35 AM Daphine Sheerer Add [R10 13] Epigastric pain     1/15/2022  3:35 AM Daphine Sheerer Add [R11 2] Non-intractable vomiting with nausea, unspecified vomiting type       ED Disposition     ED Disposition Condition Date/Time Comment    Discharge Stable Sat Nicholas 15, 5409  6:39 AM Kristen Lanza discharge to home/self care              Follow-up Information     Follow up With Specialties Details Why Contact Info Additional [address] [facility] Emergency Department Emergency Medicine Go to  If symptoms worsen Susan [address] [place] [id number]  [address] [facility] Emergency Department, [address] [facility], [place], [place], [address]  [facility] Tip Gastroenterology Schedule an appointment as soon as possible for a visit  for follow up [address] [address]  [address] [facility], [address] [facility]  [address] [facility], [place], [place], [address]           Patient's Medications   Discharge Prescriptions    ONDANSETRON (ZOFRAN ODT) 4 MG DISINTEGRATING TABLET    Take 1 tablet (4 mg total) by mouth every 6 (six) hours as needed for nausea or vomiting       Start Date: 1/15/2022 End Date: --       Order Dose: 4 mg       Quantity: 20 tablet    Refills: 0    PANTOPRAZOLE (PROTONIX) 20 MG TABLET    Take 1 tablet (20 mg total) by mouth daily       Start Date: 1/15/2022 End Date: --       Order Dose: 20 mg       Quantity: 20 tablet    Refills: 0    SUCRALFATE (CARAFATE) 1 G TABLET    Take 1 tablet (1 g total) by mouth 4 (four) times a day Mix 1 tablet in 8oz of water and drink solution 4 times a day       Start Date: 1/15/2022 End Date: --       Order Dose: 1 g       Quantity: 80 tablet    Refills: 0       No discharge procedures on file      PDMP Review     None          ED Provider  Electronically Signed by           Nestor Tavarez PA-C  01/15/22 4415

## 2022-01-19 ENCOUNTER — OFFICE VISIT (OUTPATIENT)
Dept: GASTROENTEROLOGY | Facility: CLINIC | Age: 27
End: 2022-01-19
Payer: COMMERCIAL

## 2022-01-19 VITALS
WEIGHT: 134 LBS | SYSTOLIC BLOOD PRESSURE: 112 MMHG | DIASTOLIC BLOOD PRESSURE: 76 MMHG | BODY MASS INDEX: 21.53 KG/M2 | HEIGHT: 66 IN

## 2022-01-19 DIAGNOSIS — R19.7 DIARRHEA, UNSPECIFIED TYPE: ICD-10-CM

## 2022-01-19 DIAGNOSIS — R11.2 NAUSEA AND VOMITING, INTRACTABILITY OF VOMITING NOT SPECIFIED, UNSPECIFIED VOMITING TYPE: Primary | ICD-10-CM

## 2022-01-19 DIAGNOSIS — R10.13 EPIGASTRIC PAIN: ICD-10-CM

## 2022-01-19 PROCEDURE — 3008F BODY MASS INDEX DOCD: CPT | Performed by: PHYSICIAN ASSISTANT

## 2022-01-19 PROCEDURE — 99203 OFFICE O/P NEW LOW 30 MIN: CPT | Performed by: PHYSICIAN ASSISTANT

## 2022-01-19 RX ORDER — PANTOPRAZOLE SODIUM 20 MG/1
20 TABLET, DELAYED RELEASE ORAL DAILY
Qty: 30 TABLET | Refills: 3 | Status: SHIPPED | OUTPATIENT
Start: 2022-01-19 | End: 2022-01-24 | Stop reason: SDUPTHER

## 2022-01-19 NOTE — PATIENT INSTRUCTIONS
Abdominal Pain   WHAT YOU NEED TO KNOW:   Abdominal pain can be dull, achy, or sharp  You may have pain in one area of your abdomen, or in your entire abdomen  Your pain may be caused by a condition such as constipation, food sensitivity or poisoning, infection, or a blockage  Abdominal pain can also be from a hernia, appendicitis, or an ulcer  Liver, gallbladder, or kidney conditions can also cause abdominal pain  The cause of your abdominal pain may not be known  DISCHARGE INSTRUCTIONS:   Call your local emergency number (911 in the [address] [facility]) if:   · You have new chest pain or shortness of breath  Return to the emergency department if:   · You have pulsing pain in your upper abdomen or lower back that suddenly becomes constant  · Your pain is in the right lower abdominal area and worsens with movement  · You have a fever over 100 4°F (38°C) or shaking chills  · You are vomiting and cannot keep food or liquids down  · Your pain does not improve or gets worse over the next 8 to 12 hours  · You see blood in your vomit or bowel movements, or they look black and tarry  · Your skin or the whites of your eyes turn yellow  · You are a woman and have a large amount of vaginal bleeding that is not your monthly period  Call your doctor if:   · You have pain in your lower back  · You are a man and have pain in your testicles  · You have pain when you urinate  · You have questions or concerns about your condition or care  Medicines:   · Prescription pain medicine  may be given  Ask your healthcare provider how to take this medicine safely  Some prescription pain medicines contain acetaminophen  Do not take other medicines that contain acetaminophen without talking to your healthcare provider  Too much acetaminophen may cause liver damage  Prescription pain medicine may cause constipation  Ask your healthcare provider how to prevent or treat constipation       · Medicines  may be given to calm your stomach or prevent vomiting  · Take your medicine as directed  Contact your healthcare provider if you think your medicine is not helping or if you have side effects  Tell him of her if you are allergic to any medicine  Keep a list of the medicines, vitamins, and herbs you take  Include the amounts, and when and why you take them  Bring the list or the pill bottles to follow-up visits  Carry your medicine list with you in case of an emergency  Manage your symptoms:   · Apply heat  on your abdomen for 20 to 30 minutes every 2 hours for as many days as directed  Heat helps decrease pain and muscle spasms  · Make changes to the food you eat, if needed  Do not eat foods that cause abdominal pain or other symptoms  Eat small meals more often  The following changes may also help:    ? Eat more high-fiber foods if you are constipated  High-fiber foods include fruits, vegetables, whole-grain foods, and legumes  ? Do not eat foods that cause gas if you have bloating  Examples include broccoli, cabbage, and cauliflower  Do not drink soda or carbonated drinks  These may also cause gas  ? Do not eat foods or drinks that contain sorbitol or fructose if you have diarrhea and bloating  Some examples are fruit juices, candy, jelly, and sugar-free gum  ? Do not eat high-fat foods  Examples include fried foods, cheeseburgers, hot dogs, and desserts  ? Limit or do not have caffeine  Caffeine may make symptoms such as heartburn or nausea worse  ? Drink more liquids to prevent dehydration from diarrhea or vomiting  Ask your healthcare provider how much liquid to drink each day and which liquids are best for you  · Keep a diary of your abdominal pain  A diary may help your healthcare provider learn what is causing your abdominal pain  Include when the pain happens, how long it lasts, and what the pain feels like  Write down any other symptoms you have with abdominal pain   Also write down what you eat, and what symptoms you have after you eat  · Manage your stress  Stress may cause abdominal pain  Your healthcare provider may recommend relaxation techniques and deep breathing exercises to help decrease your stress  Your healthcare provider may recommend you talk to someone about your stress or anxiety, such as a counselor or a trusted friend  Get plenty of sleep and exercise regularly  · Limit or do not drink alcohol  Alcohol can make your abdominal pain worse  Ask your healthcare provider if it is safe for you to drink alcohol  Also ask how much is safe for you to drink  · Do not smoke  Nicotine and other chemicals in cigarettes can damage your esophagus and stomach  Ask your healthcare provider for information if you currently smoke and need help to quit  E-cigarettes or smokeless tobacco still contain nicotine  Talk to your healthcare provider before you use these products  Follow up with your doctor within 24 hours or as directed:  Write down your questions so you remember to ask them during your visits  © [employer] 2021 Information is for End User's use only and may not be sold, redistributed or otherwise used for commercial purposes  All illustrations and images included in CareNotes® are the copyrighted property of [employer]  or [facility]   The above information is an  only  It is not intended as medical advice for individual conditions or treatments  Talk to your doctor, nurse or pharmacist before following any medical regimen to see if it is safe and effective for you

## 2022-01-19 NOTE — PROGRESS NOTES
Answers for HPI/ROS submitted by the patient on 1/19/2022  Chronicity: new  Onset: in the past 7 days  Onset quality: sudden  Frequency: 2 to 4 times per day  Episode duration: 1 hours  Progression since onset: waxing and waning  Pain location: epigastric region  Pain - numeric: 6/10  Pain quality: burning, cramping  Radiates to: suprapubic region  anorexia: Yes  constipation: Yes  diarrhea: Yes  myalgias: Yes  nausea: Yes  vomiting: Yes  Aggravated by: nothing  Relieved by: belching, certain positions, passing flatus  Diagnostic workup: CT scan    [facility] - Outpatient Consultation  Yelena Day 23 y o  female MRN: [id number]  Encounter: [id number]          ASSESSMENT AND PLAN:      1  Epigastric pain  2  Nausea and vomiting, intractability of vomiting not specified, unspecified vomiting type  3  Diarrhea, unspecified type  -Will start probiotic daily   -Will continue pantoprazole 20 milligrams daily for a total of 6 weeks   -Will start Gas-X 2 to 3 times a day as needed   -Will start MiraLax half a cap full daily until patient is having regular bowel movements     -Will hold off on any endoscopic evaluation at this time  Patient is already reporting improvement     -Patient will call the office with an update in 1 week     ______________________________________________________________________    HPI:    25-year-old female who presents the office today for GI consultation  Patient reports that at the beginning of January she tested positive for COVID-19  She reports that the week following her COVID-19 infection she was feeling great  She reports that last Friday at work she became acutely ill with abdominal pain located in the epigastrium, nausea, vomiting, diarrhea  Patient reports the pain became so severe she did go to the emergency department for evaluation    Patient's CT scan of the abdomen pelvis was normal   Patient reports she was started on pantoprazole 20 milligrams daily and she is reporting improvement  Patient has had no further episodes of nausea vomiting  Patient's diarrhea has now turned to constipation  Patient denies any melena or rectal bleeding  Patient reports she is eating a very bland diet  Patient has never had any gastrointestinal testing done in the past     REVIEW OF SYSTEMS:    CONSTITUTIONAL: Denies any fever, chills, rigors, and weight loss  HEENT: No earache or tinnitus  Denies hearing loss or visual disturbances  CARDIOVASCULAR: No chest pain or palpitations  RESPIRATORY: Denies any cough, hemoptysis, shortness of breath or dyspnea on exertion  GASTROINTESTINAL: As noted in the History of Present Illness  GENITOURINARY: No problems with urination  Denies any hematuria or dysuria  NEUROLOGIC: No dizziness or vertigo, denies headaches  MUSCULOSKELETAL: Denies any muscle or joint pain  SKIN: Denies skin rashes or itching  ENDOCRINE: Denies excessive thirst  Denies intolerance to heat or cold  PSYCHOSOCIAL: Denies depression or anxiety  Denies any recent memory loss         Historical Information   Past Medical History:   Diagnosis Date    Cardiac disorder     Mild tricuspid insufficiency      Past Surgical History:   Procedure Laterality Date    ARTHROSCOPIC REPAIR ACL Left 01/12/2018    NO PAST SURGERIES       Social History   Social History     Substance and Sexual Activity   Alcohol Use Yes     Social History     Substance and Sexual Activity   Drug Use No     Social History     Tobacco Use   Smoking Status Never Smoker   Smokeless Tobacco Never Used     Family History   Problem Relation Age of Onset    Diabetes Family     Hiatal hernia Family     Hypertension Family     No Known Problems Mother     No Known Problems Father     Breast cancer Paternal Grandmother 68    Colon cancer Neg Hx     Ovarian cancer Neg Hx        Meds/Allergies       Current Outpatient Medications:     albuterol (VENTOLIN HFA) 90 mcg/act inhaler    loratadine (CLARITIN) 10 mg tablet    norethindrone-ethinyl estradiol (Junel FE 1/20) 1-20 MG-MCG per tablet    ondansetron (Zofran ODT) 4 mg disintegrating tablet    pantoprazole (PROTONIX) 20 mg tablet    sucralfate (CARAFATE) 1 g tablet    No Known Allergies        Objective     Blood pressure 112/76, height 5' 6" (1 676 m), weight 60 8 kg (134 lb), not currently breastfeeding  Body mass index is 21 63 kg/m²  PHYSICAL EXAM:      General Appearance:   Alert, cooperative, no distress   HEENT:   Normocephalic, atraumatic, anicteric      Neck:  Supple, symmetrical, trachea midline   Lungs:   Clear to auscultation bilaterally; no rales, rhonchi or wheezing; respirations unlabored    Heart[de-identified]   Regular rate and rhythm; no murmur, rub, or gallop  Abdomen:   Soft, non-tender, non-distended; normal bowel sounds; no masses, no organomegaly    Genitalia:   Deferred    Rectal:   Deferred    Extremities:  No cyanosis, clubbing or edema    Pulses:  2+ and symmetric    Skin:  No jaundice, rashes, or lesions    Lymph nodes:  No palpable cervical lymphadenopathy        Lab Results:   No visits with results within 1 Day(s) from this visit     Latest known visit with results is:   Admission on 01/15/2022, Discharged on 01/15/2022   Component Date Value    WBC 01/15/2022 8 47     RBC 01/15/2022 4 89     Hemoglobin 01/15/2022 14 2     Hematocrit 01/15/2022 42 8     MCV 01/15/2022 88     MCH 01/15/2022 29 0     MCHC 01/15/2022 33 2     RDW 01/15/2022 11 9     MPV 01/15/2022 10 1     Platelets 87/32/0704 369     nRBC 01/15/2022 0     Neutrophils Relative 01/15/2022 67     Immat GRANS % 01/15/2022 0     Lymphocytes Relative 01/15/2022 20     Monocytes Relative 01/15/2022 7     Eosinophils Relative 01/15/2022 5     Basophils Relative 01/15/2022 1     Neutrophils Absolute 01/15/2022 5 75     Immature Grans Absolute 01/15/2022 0 02     Lymphocytes Absolute 01/15/2022 1 70     Monocytes Absolute 01/15/2022 0 57     Eosinophils Absolute 01/15/2022 0 39     Basophils Absolute 01/15/2022 0 04     Sodium 01/15/2022 137     Potassium 01/15/2022 4 0     Chloride 01/15/2022 100     CO2 01/15/2022 27     ANION GAP 01/15/2022 10     BUN 01/15/2022 11     Creatinine 01/15/2022 0 76     Glucose 01/15/2022 97     Calcium 01/15/2022 9 2     AST 01/15/2022 15     ALT 01/15/2022 29     Alkaline Phosphatase 01/15/2022 63     Total Protein 01/15/2022 7 9     Albumin 01/15/2022 3 8     Total Bilirubin 01/15/2022 0 86     eGFR 01/15/2022 109     EXT PREG TEST UR (Ref: N* 01/15/2022 negative     Control 01/15/2022 valid     Color, UA 01/15/2022 Yellow     Clarity, UA 01/15/2022 Clear     Specific Gravity, UA 01/15/2022 1 020     pH, UA 01/15/2022 6 0     Leukocytes, UA 01/15/2022 Negative     Nitrite, UA 01/15/2022 Negative     Protein, UA 01/15/2022 Negative     Glucose, UA 01/15/2022 Negative     Ketones, UA 01/15/2022 Negative     Urobilinogen, UA 01/15/2022 0 2     Bilirubin, UA 01/15/2022 Negative     Blood, UA 01/15/2022 Negative     Lipase 01/15/2022 83          Radiology Results:   CT abdomen pelvis with contrast    Result Date: 1/15/2022  Narrative: CT ABDOMEN AND PELVIS WITH IV CONTRAST INDICATION:   Epigastric pain  Patient has confirmed Covid-19  COMPARISON:  None  TECHNIQUE:  CT examination of the abdomen and pelvis was performed  Axial, sagittal, and coronal 2D reformatted images were created from the source data and submitted for interpretation  Radiation dose length product (DLP) for this visit:  594 mGy-cm   This examination, like all CT scans performed in the [facility], was performed utilizing techniques to minimize radiation dose exposure, including the use of iterative reconstruction and automated exposure control  IV Contrast:  100 mL of iohexol (OMNIPAQUE) Enteric Contrast:  Enteric contrast was not administered   FINDINGS: ABDOMEN LOWER CHEST:  No clinically significant abnormality identified in the visualized lower chest  LIVER/BILIARY TREE:  Unremarkable  GALLBLADDER:  No calcified gallstones  No pericholecystic inflammatory change  SPLEEN:  Unremarkable  PANCREAS:  Unremarkable  ADRENAL GLANDS:  Unremarkable  KIDNEYS/URETERS:  Unremarkable  No hydronephrosis  STOMACH AND BOWEL:  No bowel obstruction  APPENDIX:  A normal appendix was visualized  ABDOMINOPELVIC CAVITY:  No ascites  No pneumoperitoneum  No lymphadenopathy  VESSELS:  Unremarkable for patient's age  PELVIS REPRODUCTIVE ORGANS:  Unremarkable for patient's age  URINARY BLADDER:  Unremarkable  ABDOMINAL WALL/INGUINAL REGIONS:  Unremarkable  OSSEOUS STRUCTURES:  No acute fracture or destructive osseous lesion  Impression: No acute inflammatory process identified within the abdomen or pelvis   Workstation performed: [id number]

## 2022-01-19 NOTE — LETTER
January 24, 2022     Dilan Oliva PA-C  [address] [facility]    Patient: Nadia Samaniego   YOB: 1995   Date of Visit: 1/19/2022       Dear Dr Stacey Peck:    Thank you for referring Linnsangita Haley to me for evaluation  Below are my notes for this consultation  If you have questions, please do not hesitate to call me  I look forward to following your patient along with you  Sincerely,        Laura Vazquez PA-C        CC: No Recipients  Laura Vazquez PA-C  1/19/2022 10:54 AM  Attested  Answers for HPI/ROS submitted by the patient on 1/19/2022  Chronicity: new  Onset: in the past 7 days  Onset quality: sudden  Frequency: 2 to 4 times per day  Episode duration: 1 hours  Progression since onset: waxing and waning  Pain location: epigastric region  Pain - numeric: 6/10  Pain quality: burning, cramping  Radiates to: suprapubic region  anorexia: Yes  constipation: Yes  diarrhea: Yes  myalgias: Yes  nausea: Yes  vomiting: Yes  Aggravated by: nothing  Relieved by: belching, certain positions, passing flatus  Diagnostic workup: CT scan    [facility] - Outpatient Consultation  Nadia Samaniego 23 y o  female MRN: [id number]  Encounter: [id number]          ASSESSMENT AND PLAN:      1  Epigastric pain  2  Nausea and vomiting, intractability of vomiting not specified, unspecified vomiting type  3  Diarrhea, unspecified type  -Will start probiotic daily   -Will continue pantoprazole 20 milligrams daily for a total of 6 weeks   -Will start Gas-X 2 to 3 times a day as needed   -Will start MiraLax half a cap full daily until patient is having regular bowel movements     -Will hold off on any endoscopic evaluation at this time    Patient is already reporting improvement     -Patient will call the office with an update in 1 week     ______________________________________________________________________    HPI:    71-year-old female who presents the office today for GI consultation  Patient reports that at the beginning of January she tested positive for COVID-19  She reports that the week following her COVID-19 infection she was feeling great  She reports that last Friday at work she became acutely ill with abdominal pain located in the epigastrium, nausea, vomiting, diarrhea  Patient reports the pain became so severe she did go to the emergency department for evaluation  Patient's CT scan of the abdomen pelvis was normal   Patient reports she was started on pantoprazole 20 milligrams daily and she is reporting improvement  Patient has had no further episodes of nausea vomiting  Patient's diarrhea has now turned to constipation  Patient denies any melena or rectal bleeding  Patient reports she is eating a very bland diet  Patient has never had any gastrointestinal testing done in the past     REVIEW OF SYSTEMS:    CONSTITUTIONAL: Denies any fever, chills, rigors, and weight loss  HEENT: No earache or tinnitus  Denies hearing loss or visual disturbances  CARDIOVASCULAR: No chest pain or palpitations  RESPIRATORY: Denies any cough, hemoptysis, shortness of breath or dyspnea on exertion  GASTROINTESTINAL: As noted in the History of Present Illness  GENITOURINARY: No problems with urination  Denies any hematuria or dysuria  NEUROLOGIC: No dizziness or vertigo, denies headaches  MUSCULOSKELETAL: Denies any muscle or joint pain  SKIN: Denies skin rashes or itching  ENDOCRINE: Denies excessive thirst  Denies intolerance to heat or cold  PSYCHOSOCIAL: Denies depression or anxiety  Denies any recent memory loss         Historical Information   Past Medical History:   Diagnosis Date    Cardiac disorder     Mild tricuspid insufficiency      Past Surgical History:   Procedure Laterality Date    ARTHROSCOPIC REPAIR ACL Left 01/12/2018    NO PAST SURGERIES       Social History   Social History     Substance and Sexual Activity Alcohol Use Yes     Social History     Substance and Sexual Activity   Drug Use No     Social History     Tobacco Use   Smoking Status Never Smoker   Smokeless Tobacco Never Used     Family History   Problem Relation Age of Onset    Diabetes Family     Hiatal hernia Family     Hypertension Family     No Known Problems Mother     No Known Problems Father     Breast cancer Paternal Grandmother 68    Colon cancer Neg Hx     Ovarian cancer Neg Hx        Meds/Allergies       Current Outpatient Medications:     albuterol (VENTOLIN HFA) 90 mcg/act inhaler    loratadine (CLARITIN) 10 mg tablet    norethindrone-ethinyl estradiol (Junel FE 1/20) 1-20 MG-MCG per tablet    ondansetron (Zofran ODT) 4 mg disintegrating tablet    pantoprazole (PROTONIX) 20 mg tablet    sucralfate (CARAFATE) 1 g tablet    No Known Allergies        Objective     Blood pressure 112/76, height 5' 6" (1 676 m), weight 60 8 kg (134 lb), not currently breastfeeding  Body mass index is 21 63 kg/m²  PHYSICAL EXAM:      General Appearance:   Alert, cooperative, no distress   HEENT:   Normocephalic, atraumatic, anicteric      Neck:  Supple, symmetrical, trachea midline   Lungs:   Clear to auscultation bilaterally; no rales, rhonchi or wheezing; respirations unlabored    Heart[de-identified]   Regular rate and rhythm; no murmur, rub, or gallop  Abdomen:   Soft, non-tender, non-distended; normal bowel sounds; no masses, no organomegaly    Genitalia:   Deferred    Rectal:   Deferred    Extremities:  No cyanosis, clubbing or edema    Pulses:  2+ and symmetric    Skin:  No jaundice, rashes, or lesions    Lymph nodes:  No palpable cervical lymphadenopathy        Lab Results:   No visits with results within 1 Day(s) from this visit     Latest known visit with results is:   Admission on 01/15/2022, Discharged on 01/15/2022   Component Date Value    WBC 01/15/2022 8 47     RBC 01/15/2022 4 89     Hemoglobin 01/15/2022 14 2     Hematocrit 01/15/2022 42 8     MCV 01/15/2022 88     MCH 01/15/2022 29 0     MCHC 01/15/2022 33 2     RDW 01/15/2022 11 9     MPV 01/15/2022 10 1     Platelets 87/00/3199 369     nRBC 01/15/2022 0     Neutrophils Relative 01/15/2022 67     Immat GRANS % 01/15/2022 0     Lymphocytes Relative 01/15/2022 20     Monocytes Relative 01/15/2022 7     Eosinophils Relative 01/15/2022 5     Basophils Relative 01/15/2022 1     Neutrophils Absolute 01/15/2022 5 75     Immature Grans Absolute 01/15/2022 0 02     Lymphocytes Absolute 01/15/2022 1 70     Monocytes Absolute 01/15/2022 0 57     Eosinophils Absolute 01/15/2022 0 39     Basophils Absolute 01/15/2022 0 04     Sodium 01/15/2022 137     Potassium 01/15/2022 4 0     Chloride 01/15/2022 100     CO2 01/15/2022 27     ANION GAP 01/15/2022 10     BUN 01/15/2022 11     Creatinine 01/15/2022 0 76     Glucose 01/15/2022 97     Calcium 01/15/2022 9 2     AST 01/15/2022 15     ALT 01/15/2022 29     Alkaline Phosphatase 01/15/2022 63     Total Protein 01/15/2022 7 9     Albumin 01/15/2022 3 8     Total Bilirubin 01/15/2022 0 86     eGFR 01/15/2022 109     EXT PREG TEST UR (Ref: N* 01/15/2022 negative     Control 01/15/2022 valid     Color, UA 01/15/2022 Yellow     Clarity, UA 01/15/2022 Clear     Specific Gravity, UA 01/15/2022 1 020     pH, UA 01/15/2022 6 0     Leukocytes, UA 01/15/2022 Negative     Nitrite, UA 01/15/2022 Negative     Protein, UA 01/15/2022 Negative     Glucose, UA 01/15/2022 Negative     Ketones, UA 01/15/2022 Negative     Urobilinogen, UA 01/15/2022 0 2     Bilirubin, UA 01/15/2022 Negative     Blood, UA 01/15/2022 Negative     Lipase 01/15/2022 83          Radiology Results:   CT abdomen pelvis with contrast    Result Date: 1/15/2022  Narrative: CT ABDOMEN AND PELVIS WITH IV CONTRAST INDICATION:   Epigastric pain  Patient has confirmed Covid-19  COMPARISON:  None  TECHNIQUE:  CT examination of the abdomen and pelvis was performed  Axial, sagittal, and coronal 2D reformatted images were created from the source data and submitted for interpretation  Radiation dose length product (DLP) for this visit:  594 mGy-cm   This examination, like all CT scans performed in the [facility], was performed utilizing techniques to minimize radiation dose exposure, including the use of iterative reconstruction and automated exposure control  IV Contrast:  100 mL of iohexol (OMNIPAQUE) Enteric Contrast:  Enteric contrast was not administered  FINDINGS: ABDOMEN LOWER CHEST:  No clinically significant abnormality identified in the visualized lower chest  LIVER/BILIARY TREE:  Unremarkable  GALLBLADDER:  No calcified gallstones  No pericholecystic inflammatory change  SPLEEN:  Unremarkable  PANCREAS:  Unremarkable  ADRENAL GLANDS:  Unremarkable  KIDNEYS/URETERS:  Unremarkable  No hydronephrosis  STOMACH AND BOWEL:  No bowel obstruction  APPENDIX:  A normal appendix was visualized  ABDOMINOPELVIC CAVITY:  No ascites  No pneumoperitoneum  No lymphadenopathy  VESSELS:  Unremarkable for patient's age  PELVIS REPRODUCTIVE ORGANS:  Unremarkable for patient's age  URINARY BLADDER:  Unremarkable  ABDOMINAL WALL/INGUINAL REGIONS:  Unremarkable  OSSEOUS STRUCTURES:  No acute fracture or destructive osseous lesion  Impression: No acute inflammatory process identified within the abdomen or pelvis  Workstation performed: [id number]   Attestation signed by Perfecto Nick DO at 1/23/2022  5:49 PM:  I reviewed the chart  I supervised my physician assistant and I agree with her assessment and plan

## 2022-01-24 DIAGNOSIS — R10.13 EPIGASTRIC PAIN: ICD-10-CM

## 2022-01-24 RX ORDER — PANTOPRAZOLE SODIUM 20 MG/1
20 TABLET, DELAYED RELEASE ORAL DAILY
Qty: 30 TABLET | Refills: 3 | Status: SHIPPED | OUTPATIENT
Start: 2022-01-24

## 2022-01-24 NOTE — TELEPHONE ENCOUNTER
----- Message from Conrad Moffett sent at 1/23/2022  8:25 PM EST -----  Regarding: Medication Denied  Hello! My [address] home delivery denied the medication that was prescribed during my last visit  Is there a way you can send me the script? The [facility] by me filled it or Im not sure if theres a way to give [address] more information as they said they needed more information for the prescription  Any help is appreciated  Thank you!

## 2022-01-27 ENCOUNTER — TELEPHONE (OUTPATIENT)
Dept: GASTROENTEROLOGY | Facility: CLINIC | Age: 27
End: 2022-01-27

## 2022-01-27 NOTE — TELEPHONE ENCOUNTER
----- Message from Faustina Schroeder sent at 1/26/2022  7:35 PM EST -----  Regarding: Update  Calin Patricia! I hope you are doing well  I just wanted to let you know that each day I am feeling better! So we are getting there! How long do you want me to take the medication that you prescribed me?  Thank you

## 2022-01-27 NOTE — TELEPHONE ENCOUNTER
Please inform patient that I am glad she is feeling better and I would like her to continue all medications that we discussed prescribed for a total of 6 weeks    Thank you

## 2022-02-04 ENCOUNTER — TELEPHONE (OUTPATIENT)
Dept: OBGYN CLINIC | Facility: CLINIC | Age: 27
End: 2022-02-04

## 2022-02-04 DIAGNOSIS — Z01.419 ENCOUNTER FOR GYNECOLOGICAL EXAMINATION WITHOUT ABNORMAL FINDING: ICD-10-CM

## 2022-02-04 NOTE — PROGRESS NOTES
Kristen Bray  [birth year]    CC:  Yearly exam    S:  23 y o  female here for yearly exam  Her cycles are regular, not heavy or crampy  She did have COVID in January and had a change in exercise routine because of that; she did have some spotting with this but she believes this has since resolved  Sexual activity: She is sexually active with her  without pain, bleeding or dryness  Contraception:  She uses Junel Fe 1/20 for contraception  They may consider pregnancy in 3-4 years  STD testing:  She does not request STD testing today  Gardasil:  She has had the Gardasil series  She is now working at [employer]  We reviewed ASCCP guidelines for Pap testing       Last Pap 1/8/21 neg    Family hx of breast cancer: PGM  Family hx of ovarian cancer: no  Family hx of colon cancer: no      Current Outpatient Medications:     albuterol (VENTOLIN HFA) 90 mcg/act inhaler, Take 1 or 2 puffs 15 minutes before physical activity, Disp: 1 Inhaler, Rfl: 0    loratadine (CLARITIN) 10 mg tablet, Take 1 tablet (10 mg total) by mouth daily, Disp: 30 tablet, Rfl:     ondansetron (Zofran ODT) 4 mg disintegrating tablet, Take 1 tablet (4 mg total) by mouth every 6 (six) hours as needed for nausea or vomiting, Disp: 20 tablet, Rfl: 0    pantoprazole (PROTONIX) 20 mg tablet, Take 1 tablet (20 mg total) by mouth daily, Disp: 30 tablet, Rfl: 3    norethindrone-ethinyl estradiol (Junel FE 1/20) 1-20 MG-MCG per tablet, TAKE 1 TABLET BY MOUTH  DAILY, Disp: 84 tablet, Rfl: 0  Social History     Socioeconomic History    Marital status: /Civil Union     Spouse name: Not on file    Number of children: Not on file    Years of education: Not on file    Highest education level: Not on file   Occupational History    Not on file   Tobacco Use    Smoking status: Never Smoker    Smokeless tobacco: Never Used   Vaping Use    Vaping Use: Never used   Substance and Sexual Activity    Alcohol use: Yes    Drug use: No    Sexual activity: Yes     Partners: Male     Birth control/protection: OCP   Other Topics Concern    Not on file   Social History Narrative    Always uses seat belt     Caffeine use     Exercising regularly     Seeing a dentist     Social Determinants of Health     Financial Resource Strain: Not on file   Food Insecurity: Not on file   Transportation Needs: Not on file   Physical Activity: Not on file   Stress: Not on file   Social Connections: Not on file   Intimate Partner Violence: Not on file   Housing Stability: Not on file     Family History   Problem Relation Age of Onset    Diabetes Family     Hiatal hernia Family     Hypertension Family     No Known Problems Mother     No Known Problems Father     Breast cancer Paternal Grandmother 68    Diabetes Paternal Grandfather     Kidney disease Paternal Grandfather     Colon cancer Neg Hx     Ovarian cancer Neg Hx      Past Medical History:   Diagnosis Date    Cardiac disorder     Mild tricuspid insufficiency        Review of Systems   Respiratory: Negative  Cardiovascular: Negative  Gastrointestinal: Negative for constipation and diarrhea  Genitourinary: Negative for difficulty urinating, pelvic pain, vaginal bleeding, vaginal discharge, itching or odor  O:  Blood pressure 122/78, height 5' 4 96" (1 65 m), weight 60 kg (132 lb 3 2 oz), last menstrual period 01/31/2022, not currently breastfeeding  Patient appears well and is not in distress  Neck is supple without masses  Breasts are symmetrical without mass, tenderness, nipple discharge, skin changes or adenopathy  Abdomen is soft and nontender without masses  External genitals are normal without lesions or rashes  Urethra and urethral meatus are normal  Bladder is normal to palpation  Vagina is normal without discharge or bleeding  Cervix is normal without discharge or lesion  Uterus is normal, mobile, nontender without palpable mass    Adnexa are normal, nontender, without palpable mass  A:  Yearly exam      P:   Pap 2024   Junel Fe 1/20 sent to mail order    RTO one year for yearly exam or sooner as needed

## 2022-02-04 NOTE — TELEPHONE ENCOUNTER
Pharmacy [facility] ([facility] [place]) [address]   [address] [facility] [address], [address] [address] called for refill for 's [facility]    Thank you

## 2022-02-07 ENCOUNTER — ANNUAL EXAM (OUTPATIENT)
Dept: OBGYN CLINIC | Facility: MEDICAL CENTER | Age: 27
End: 2022-02-07
Payer: COMMERCIAL

## 2022-02-07 VITALS
BODY MASS INDEX: 22.02 KG/M2 | SYSTOLIC BLOOD PRESSURE: 122 MMHG | HEIGHT: 65 IN | WEIGHT: 132.2 LBS | DIASTOLIC BLOOD PRESSURE: 78 MMHG

## 2022-02-07 DIAGNOSIS — Z01.419 ENCOUNTER FOR GYNECOLOGICAL EXAMINATION WITHOUT ABNORMAL FINDING: ICD-10-CM

## 2022-02-07 DIAGNOSIS — Z01.419 ENCNTR FOR GYN EXAM (GENERAL) (ROUTINE) W/O ABN FINDINGS: Primary | ICD-10-CM

## 2022-02-07 PROBLEM — M25.569 KNEE PAIN: Status: RESOLVED | Noted: 2022-01-04 | Resolved: 2022-02-07

## 2022-02-07 PROCEDURE — S0612 ANNUAL GYNECOLOGICAL EXAMINA: HCPCS | Performed by: PHYSICIAN ASSISTANT

## 2022-02-07 PROCEDURE — 3008F BODY MASS INDEX DOCD: CPT | Performed by: PHYSICIAN ASSISTANT

## 2022-02-07 RX ORDER — NORETHINDRONE ACETATE AND ETHINYL ESTRADIOL 1MG-20(21)
KIT ORAL
Qty: 84 TABLET | Refills: 4 | Status: SHIPPED | OUTPATIENT
Start: 2022-02-07

## 2022-02-07 RX ORDER — NORETHINDRONE ACETATE AND ETHINYL ESTRADIOL 1MG-20(21)
KIT ORAL
Qty: 84 TABLET | Refills: 0 | Status: SHIPPED | OUTPATIENT
Start: 2022-02-07 | End: 2022-02-07 | Stop reason: SDUPTHER

## 2023-02-14 ENCOUNTER — ANNUAL EXAM (OUTPATIENT)
Dept: OBGYN CLINIC | Facility: CLINIC | Age: 28
End: 2023-02-14

## 2023-02-14 VITALS
DIASTOLIC BLOOD PRESSURE: 88 MMHG | BODY MASS INDEX: 22.34 KG/M2 | HEIGHT: 66 IN | WEIGHT: 139 LBS | SYSTOLIC BLOOD PRESSURE: 126 MMHG

## 2023-02-14 DIAGNOSIS — Z01.419 ENCOUNTER FOR ANNUAL ROUTINE GYNECOLOGICAL EXAMINATION: Primary | ICD-10-CM

## 2023-02-14 RX ORDER — NORETHINDRONE ACETATE AND ETHINYL ESTRADIOL 1MG-20(21)
1 KIT ORAL DAILY
Qty: 84 TABLET | Refills: 4 | Status: SHIPPED | OUTPATIENT
Start: 2023-02-14

## 2023-02-14 NOTE — PROGRESS NOTES
Patient is here today for a gynecological annual exam    No questions or concerns today  LMP: 2023   Menarche age: 16    BC: OCP    Last pap: 2021     Hx of abnormal paps? No    Gardasil: Series completed

## 2023-02-14 NOTE — PATIENT INSTRUCTIONS
Birth Control Pills   WHAT YOU NEED TO KNOW:   What are birth control pills? Birth control pills are also called oral contraceptives, or the pill  It is medicine that helps prevent pregnancy by stopping ovulation  Ovulation is when the ovaries make and release an egg cell each month  If this egg gets fertilized by sperm, pregnancy occurs  You will need to take the pill at the same time every day  Your healthcare provider will tell you when to start taking the pill  You will also be told what to do if you miss a dose  Instructions will depend on the kind of birth control pills you are taking  What are the different kinds of birth control pills? Some kinds are taken for 21 days in a row, followed by 7 days of placebo (no hormones) pills  Other kinds are taken for 24 days followed by 4 days of placebos  Each kind has a certain amount of female hormones  Your provider will decide on the kind that is best for you based on your age and other health conditions  What may be done before I can start taking birth control pills? You need to see your healthcare provider to get a prescription  Any of the following may be done before your healthcare provider gives you a prescription:  Your healthcare provider will ask about diseases and illnesses you have had in the past  Your provider will check your risk for blood clots, heart conditions, or stroke  Tell your provider if you had gastric bypass surgery  This surgery can affect the way your body absorbs medicines such as birth control pills  Your provider will also check your blood pressure, and may do a breast and pelvic exam  A Pap smear may also be done during the pelvic exam  This is a test to make sure you do not have abnormal changes on your cervix  You may need other tests, such as a urine test to make sure you are not pregnant  Your provider will ask if you take any medicines and if you smoke   Smoking increases your risk for stroke, heart attack, or a blood clot in your lungs  If you smoke, you should not take certain kinds of birth control pills  What are the advantages of birth control pills? When birth control pills are used correctly, the chances of getting pregnant are very low  Birth control pills may help decrease bleeding and pain during your monthly period  They may also help prevent cancer of the uterus and ovaries  What are the disadvantages of birth control pills? You may have sudden changes in your mood or feelings while you take birth control pills  You may have nausea and a decreased sex drive  You may have an increased appetite and rapid weight gain  You may also have bleeding in between periods, less frequent periods, vaginal dryness, and breast pain  Birth control pills will not protect you from sexually transmitted infections  Rarely, some birth control pills can increase your risk for a blood clot  This may become life-threatening  What should I do if I decide I want to get pregnant? If you are planning to have a baby, ask your healthcare provider when you may stop taking your birth control pills  It may take some time for you to start ovulating again  Ask your healthcare provider for more information about pregnancy after birth control pills  When should I start taking birth control pills after I have a baby? If you are not breastfeeding, you may start taking birth control pills 3 weeks after you give birth  You may be able to take certain types of birth control pills if you are breastfeeding  These pills can be started from 6 weeks to 6 months after you give birth  Ask your healthcare provider for more information about when to start taking birth control pills after you give birth  What do I need to know about birth control pills and menopause? Talk with your healthcare provider if you want to take birth control pills around menopause  Around age 39, you will enter into perimenopause   This means your hormone levels are dropping and you are ovulating less often  You can still become pregnant during this time  The risk for problems, such as miscarriage, are higher if you become pregnant after age 39  Birth control pills will prevent pregnancy, and may also help prevent or relieve some signs and symptoms of menopause  Examples are hot flashes and mood swings  Your provider will do tests when you are around age 48  The tests may show that you are in menopause  If the tests do not show menopause for sure, you may be able to continue taking the pill up to age 54  The decision will depend on your health and if you have any medical conditions, such as a blood clot  Call your local emergency number (911 in the [address]) for any of the following: You have any of the following signs of a stroke:      Numbness or drooping on one side of your face     Weakness in an arm or leg    Confusion or difficulty speaking    Dizziness, a severe headache, or vision loss    You feel lightheaded, short of breath, and have chest pain  You cough up blood  When should I seek immediate care? Your arm or leg feels warm, tender, and painful  It may look swollen and red  You have severe pain, numbness, or swelling in your arms or legs  When should I call my doctor? You have forgotten to take a birth control pill  You have mood changes, such as depression, since starting birth control pills  You have nausea or are vomiting  You have severe abdominal pain  You missed a period and have questions or concerns about being pregnant  You still have bleeding 4 months after taking birth control pills correctly  You have questions or concerns about your condition or care  CARE AGREEMENT:   You have the right to help plan your care  Learn about your health condition and how it may be treated  Discuss treatment options with your healthcare providers to decide what care you want to receive  You always have the right to refuse treatment   The above information is an  only  It is not intended as medical advice for individual conditions or treatments  Talk to your doctor, nurse or pharmacist before following any medical regimen to see if it is safe and effective for you  © Copyright [employer] 2022 Information is for End User's use only and may not be sold, redistributed or otherwise used for commercial purposes  All illustrations and images included in CareNotes® are the copyrighted property of A D A M , Inc  or Dunia Douglas  Breast Self Exam for Women   AMBULATORY CARE:   A breast self-exam (BSE)  is a way to check your breasts for lumps and other changes  Regular BSEs can help you know how your breasts normally look and feel  Most breast lumps or changes are not cancer, but you should always have them checked by a healthcare provider  Why you should do a BSE:  Breast cancer is the most common type of cancer in women  Even if you have mammograms, you may still want to do a BSE regularly  If you know how your breasts normally feel and look, it may help you know when to contact your healthcare provider  Mammograms can miss some cancers  You may find a lump during a BSE that did not show up on a mammogram   When you should do a BSE:  If you have periods, you may want to do your BSE 1 week after your period ends  This is the time when your breasts may be the least swollen, lumpy, or tender  You can do regular BSEs even if you are breastfeeding or have breast implants  Call your doctor if:   You find any lumps or changes in your breasts  You have breast pain or fluid coming from your nipples  You have questions or concerns about your condition or care  How to do a BSE:       Look at your breasts in a mirror  Look at the size and shape of each breast and nipple  Check for swelling, lumps, dimpling, scaly skin, or other skin changes  Look for nipple changes, such as a nipple that is painful or beginning to pull inward   Gently squeeze both nipples and check to see if fluid (that is not breast milk) comes out of them  If you find any of these or other breast changes, contact your healthcare provider  Check your breasts while you sit or  the following 3 positions:    [place] your arms down at your sides  Raise your hands and join them behind your head  Put firm pressure with your hands on your hips  Bend slightly forward while you look at your breasts in the mirror  Lie down and feel your breasts  When you lie down, your breast tissue spreads out evenly over your chest  This makes it easier for you to feel for lumps and anything that may not be normal for your breasts  Do a BSE on one breast at a time  Place a small pillow or towel under your left shoulder  Put your left arm behind your head  Use the 3 middle fingers of your right hand  Use your fingertip pads, on the top of your fingers  Your fingertip pad is the most sensitive part of your finger  Use small circles to feel your breast tissue  Use your fingertip pads to make dime-sized, overlapping circles on your breast and armpits  Use light, medium, and firm pressure  First, press lightly  Second, press with medium pressure to feel a little deeper into the breast  Last, use firm pressure to feel deep within your breast     Examine your entire breast area  Examine the breast area from above the breast to below the breast where you feel only ribs  Make small circles with your fingertips, starting in the middle of your armpit  Make circles going up and down the breast area  Continue toward your breast and all the way across it  Examine the area from your armpit all the way over to the middle of your chest (breastbone)  Stop at the middle of your chest     Move the pillow or towel to your right shoulder, and put your right arm behind your head    Use the 3 fingertip pads of your left hand, and repeat the above steps to do a BSE on your right breast     What else you can do to check for breast problems or cancer:  Talk to your healthcare provider about mammograms  A mammogram is an x-ray of your breasts to screen for breast cancer or other problems  Your provider can tell you the benefits and risks of mammograms  The first mammogram is usually at age 39 or 48  Your provider may recommend you start at 36 or younger if your risk for breast cancer is high  Mammograms usually continue every 1 to 2 years until age 76  Follow up with your doctor as directed:  Write down your questions so you remember to ask them during your visits  © [employer] 2022 Information is for End User's use only and may not be sold, redistributed or otherwise used for commercial purposes  All illustrations and images included in CareNotes® are the copyrighted property of A D A M , Inc  or Dunia Douglas  The above information is an  only  It is not intended as medical advice for individual conditions or treatments  Talk to your doctor, nurse or pharmacist before following any medical regimen to see if it is safe and effective for you

## 2023-02-14 NOTE — PROGRESS NOTES
Kristen Lanza      Assessment and Plan:  Yearly exam    Encounter for annual routine gynecological examination  Pap UTD  ASCCP guidelines reviewed  Self breast awareness encouraged  Daily exercise and healthy diet with adequate calcium and vitamin D encouraged  Weight bearing exercises a minimum of 150 minutes/week advised  Advised to call with any issues, all concerns & questions addressed  See provided information in your after visit summary     Diagnoses and all orders for this visit:    Encounter for annual routine gynecological examination    Encounter for gynecological examination without abnormal finding  -     norethindrone-ethinyl estradiol (Junel FE 1/20) 1-20 MG-MCG per tablet; Take 1 tablet by mouth daily      F/U Annually and PRN    Health Maintenance:    Last PAP: 2021  NILM  Next PAP Due: 2024    Gardasil Vaccine Series: She has had the Gardasil series  Subjective    CC: Yearly Exam     Kristen ARGUETA Juan Barba is a 33 y o  female  here for an annual exam   No concerns today  Menarche: 13    Patient's last menstrual period was 2023 (exact date)  Sexual activity: She is sexually active without pain, bleeding or dryness  Contraception: OCPs- very happy with her current BCM  STD testing:  She does not want STD testing today  Non- smoker, social drinker  Exercise- most days  Her menstrual cycles are regular every 28-30 days  She denies any issues with bleeding or her menses  She denies breast concerns, abnormal vaginal discharge, vaginal itching, odor, irritation, bowel/bladder dysfunction, urinary symptoms, pelvic pain, or dyspareunia today       Family hx of breast cancer: PGM (68)  Family hx of ovarian cancer: No  Family hx of colon cancer: No    Past Medical History:   Diagnosis Date   • Cardiac disorder     Mild tricuspid insufficiency      Past Surgical History:   Procedure Laterality Date   • ARTHROSCOPIC REPAIR ACL Left 2018   • NO PAST SURGERIES         Immunization History   Administered Date(s) Administered   • DTaP 5 1996, 1996, 02/19/1997, 08/26/1997, 04/23/2001   • DTaP,unspecified 02/19/1997, 08/26/1997, 04/23/2001, 08/23/2007, 06/11/2014   • HPV Quadrivalent 08/23/2007, 10/29/2007, 03/03/2008   • Hep A, adult 12/13/2006, 03/03/2008   • Hepatitis B 1996, 1996, 1996   • IPV 1996, 1996, 02/19/1997, 08/26/1997, 04/23/2001   • MMR 08/26/1997, 04/23/2001   • Meningococcal Polysaccharide (MPSV4) 08/23/2007   • Tdap 08/23/2007   • Tuberculin Skin Test-PPD Intradermal 10/05/2018, 10/02/2019, 09/14/2020, 08/24/2021   • Varicella 05/21/1997, 08/23/2007       Family History   Problem Relation Age of Onset   • No Known Problems Mother    • No Known Problems Father    • Breast cancer Paternal Grandmother 68   • Diabetes Paternal Grandfather    • Kidney disease Paternal Grandfather    • Diabetes Family    • Hiatal hernia Family    • Hypertension Family    • Colon cancer Neg Hx    • Ovarian cancer Neg Hx    • Uterine cancer Neg Hx    • Cervical cancer Neg Hx      Social History     Tobacco Use   • Smoking status: Never   • Smokeless tobacco: Never   Vaping Use   • Vaping Use: Never used   Substance Use Topics   • Alcohol use:  Yes   • Drug use: No       Current Outpatient Medications:   •  albuterol (VENTOLIN HFA) 90 mcg/act inhaler, Take 1 or 2 puffs 15 minutes before physical activity, Disp: 1 Inhaler, Rfl: 0  •  loratadine (CLARITIN) 10 mg tablet, Take 1 tablet (10 mg total) by mouth daily, Disp: 30 tablet, Rfl:   •  norethindrone-ethinyl estradiol (Junel FE 1/20) 1-20 MG-MCG per tablet, Take 1 tablet by mouth daily, Disp: 84 tablet, Rfl: 4  Patient Active Problem List    Diagnosis Date Noted   • Encounter for annual routine gynecological examination 02/14/2023   • Exercise-induced bronchospasm 10/05/2018   • Tricuspid insufficiency 12/08/2016       Allergies   Allergen Reactions   • Pineapple - Food Allergy Abdominal Pain, Diarrhea and Itching       OB History    Para Term  AB Living   0 0 0 0 0 0   SAB IAB Ectopic Multiple Live Births   0 0 0 0 0       Vitals:    23 0706   BP: 126/88   BP Location: Left arm   Patient Position: Sitting   Cuff Size: Standard   Weight: 63 kg (139 lb)   Height: 5' 5 5" (1 664 m)     Body mass index is 22 78 kg/m²  Review of Systems   Constitutional: Negative for chills, fatigue, fever and unexpected weight change  Respiratory: Negative for shortness of breath  Cardiovascular: Negative for chest pain  Gastrointestinal: Negative for abdominal pain, constipation, diarrhea, nausea and vomiting  Genitourinary: Negative for difficulty urinating, dyspareunia, dysuria, frequency, genital sores, hematuria, menstrual problem, pelvic pain, urgency, vaginal bleeding, vaginal discharge and vaginal pain  Musculoskeletal: Negative for back pain and myalgias  Skin: Negative for pallor and rash  Neurological: Negative for dizziness, light-headedness and headaches  Hematological: Negative for adenopathy  Psychiatric/Behavioral: Negative for dysphoric mood  Physical Exam  Constitutional:       General: She is not in acute distress  Appearance: Normal appearance  She is not ill-appearing  Genitourinary:      Bladder and urethral meatus normal       No lesions in the vagina  Right Labia: No rash, tenderness, lesions, skin changes or Bartholin's cyst      Left Labia: No tenderness, lesions, skin changes, Bartholin's cyst or rash  No inguinal adenopathy present in the right or left side  No vaginal discharge, erythema, tenderness, bleeding or ulceration  Right Adnexa: not tender, not full and no mass present  Left Adnexa: not tender, not full and no mass present  Cervix is nulliparous  No cervical motion tenderness, discharge, friability, lesion, polyp, nabothian cyst, eversion or elongation        Uterus is not enlarged, fixed or tender  No uterine mass detected  No urethral prolapse, tenderness or discharge present  Bladder is not tender and urgency on palpation not present  Pelvic exam was performed with patient in the lithotomy position  Breasts:     Right: No swelling, inverted nipple, mass, nipple discharge, skin change or tenderness  Left: No swelling, inverted nipple, mass, nipple discharge, skin change or tenderness  HENT:      Head: Normocephalic and atraumatic  Eyes:      Conjunctiva/sclera: Conjunctivae normal    Pulmonary:      Effort: Pulmonary effort is normal    Abdominal:      General: There is no distension  Palpations: Abdomen is soft  Tenderness: There is no abdominal tenderness  Musculoskeletal:         General: Normal range of motion  Cervical back: Neck supple  Lymphadenopathy:      Upper Body:      Right upper body: No supraclavicular or axillary adenopathy  Left upper body: No supraclavicular or axillary adenopathy  Lower Body: No right inguinal adenopathy  No left inguinal adenopathy  Neurological:      Mental Status: She is alert and oriented to person, place, and time  Skin:     General: Skin is warm and dry  Psychiatric:         Mood and Affect: Mood normal          Behavior: Behavior normal          Thought Content: Thought content normal          Judgment: Judgment normal    Vitals and nursing note reviewed

## 2023-02-14 NOTE — ASSESSMENT & PLAN NOTE
Pap UTD  ASCCP guidelines reviewed  Self breast awareness encouraged  Daily exercise and healthy diet with adequate calcium and vitamin D encouraged  Weight bearing exercises a minimum of 150 minutes/week advised  Advised to call with any issues, all concerns & questions addressed     See provided information in your after visit summary

## 2023-05-03 ENCOUNTER — OFFICE VISIT (OUTPATIENT)
Dept: GASTROENTEROLOGY | Facility: CLINIC | Age: 28
End: 2023-05-03

## 2023-05-03 VITALS
BODY MASS INDEX: 22.18 KG/M2 | HEIGHT: 66 IN | OXYGEN SATURATION: 100 % | HEART RATE: 95 BPM | DIASTOLIC BLOOD PRESSURE: 79 MMHG | WEIGHT: 138 LBS | SYSTOLIC BLOOD PRESSURE: 113 MMHG

## 2023-05-03 DIAGNOSIS — R11.2 NAUSEA AND VOMITING, UNSPECIFIED VOMITING TYPE: ICD-10-CM

## 2023-05-03 DIAGNOSIS — R10.13 EPIGASTRIC PAIN: Primary | ICD-10-CM

## 2023-05-03 RX ORDER — PANTOPRAZOLE SODIUM 40 MG/1
40 TABLET, DELAYED RELEASE ORAL DAILY
Qty: 90 TABLET | Refills: 3 | Status: SHIPPED | OUTPATIENT
Start: 2023-05-03

## 2023-05-03 NOTE — LETTER
May 3, 2023     Gulshan Frederick PA-C  [address] [facility]    Patient: Chyna Calzada   YOB: 1995   Date of Visit: 5/3/2023       Dear Dr Adrienne Cotton:    Thank you for referring Bjley Apley to me for evaluation  Below are my notes for this consultation  If you have questions, please do not hesitate to call me  I look forward to following your patient along with you  Sincerely,        Gera Moreau PA-C        CC: No Recipients  Gera Moreau PA-C  5/3/2023 12:15 PM  Sign when Signing Visit  [facility] - Outpatient Follow-up Note  Kristen Swenson 33 y o  female MRN: [id number]  Encounter: [id number]          ASSESSMENT AND PLAN:      1  Epigastric pain  2  Nausea and vomiting, unspecified vomiting type  -At this time we will plan a right upper quadrant ultrasound     -Patient will continue pantoprazole 40 mg daily   -We will check food allergy panel     -Follow-up in 6 to 8 weeks  ______________________________________________________________________    SUBJECTIVE:    61-year-old female presents to the GI clinic today for follow-up  Patient was actually evaluated a little over a year ago for very similar symptoms  Patient has an acute onset of epigastric abdominal burning with associated nausea and vomiting that was on and off and worsened over 24 to 48 hours     She reports that she was symptomatic for several days before she started feeling better  She reports that she did have an episode of diarrhea during this time  She reports again that she had a very similar presentation about a year ago  She reports that the only thing that she has now realizes that she is allergic to pineapple  She reports that she was given dicyclomine in the emergency department with no benefit  She reports that she had some pantoprazole at home that actually helped her  Patient denies any specific dietary triggers  "Patient denies any stress triggers  Laboratories in the emergency department did show a slightly elevated bilirubin level as well as a slightly elevated white blood cell count  Patient CT showed no acute intra-abdominal abnormality  REVIEW OF SYSTEMS IS OTHERWISE NEGATIVE  Historical Information   Past Medical History:   Diagnosis Date    Cardiac disorder     Mild tricuspid insufficiency      Past Surgical History:   Procedure Laterality Date    ARTHROSCOPIC REPAIR ACL Left 01/12/2018    NO PAST SURGERIES       Social History   Social History     Substance and Sexual Activity   Alcohol Use Yes     Social History     Substance and Sexual Activity   Drug Use No     Social History     Tobacco Use   Smoking Status Never   Smokeless Tobacco Never     Family History   Problem Relation Age of Onset    No Known Problems Mother     No Known Problems Father     Breast cancer Paternal Grandmother 68    Diabetes Paternal Grandfather     Kidney disease Paternal Grandfather     Diabetes Family     Hiatal hernia Family     Hypertension Family     Colon cancer Neg Hx     Ovarian cancer Neg Hx     Uterine cancer Neg Hx     Cervical cancer Neg Hx        Meds/Allergies        Current Outpatient Medications:     albuterol (VENTOLIN HFA) 90 mcg/act inhaler    loratadine (CLARITIN) 10 mg tablet    norethindrone-ethinyl estradiol (Junel FE 1/20) 1-20 MG-MCG per tablet    Allergies   Allergen Reactions    Pineapple - Food Allergy Abdominal Pain, Diarrhea and Itching           Objective      Blood pressure 113/79, pulse 95, height 5' 5 5\" (1 664 m), weight 62 6 kg (138 lb), SpO2 100 %, not currently breastfeeding  Body mass index is 22 62 kg/m²        PHYSICAL EXAM:      General Appearance:   Alert, cooperative, no distress   HEENT:   Normocephalic, atraumatic, anicteric      Neck:  Supple, symmetrical, trachea midline   Lungs:   Clear to auscultation bilaterally; no rales, rhonchi or wheezing; respirations " unlabored    Heart[de-identified]   Regular rate and rhythm; no murmur, rub, or gallop  Abdomen:   Soft, non-tender, non-distended; normal bowel sounds; no masses, no organomegaly    Genitalia:   Deferred    Rectal:   Deferred    Extremities:  No cyanosis, clubbing or edema    Pulses:  2+ and symmetric    Skin:  No jaundice, rashes, or lesions    Lymph nodes:  No palpable cervical lymphadenopathy        Lab Results:   No visits with results within 1 Day(s) from this visit     Latest known visit with results is:   Admission on 04/14/2023, Discharged on 04/14/2023   Component Date Value    Sodium 04/14/2023 137     Potassium 04/14/2023 4 4     Chloride 04/14/2023 103     CO2 04/14/2023 24     ANION GAP 04/14/2023 10     BUN 04/14/2023 12     Creatinine 04/14/2023 0 71     Glucose 04/14/2023 97     Calcium 04/14/2023 9 5     AST 04/14/2023 14     ALT 04/14/2023 17     Alkaline Phosphatase 04/14/2023 53     Total Protein 04/14/2023 7 7     Albumin 04/14/2023 4 4     Total Bilirubin 04/14/2023 1 02 (H)     eGFR 04/14/2023 117     WBC 04/14/2023 10 70 (H)     RBC 04/14/2023 4 83     Hemoglobin 04/14/2023 14 0     Hematocrit 04/14/2023 42 7     MCV 04/14/2023 88     MCH 04/14/2023 29 0     MCHC 04/14/2023 32 8     RDW 04/14/2023 12 1     MPV 04/14/2023 9 8     Platelets 56/56/6845 323     nRBC 04/14/2023 0     Neutrophils Relative 04/14/2023 88 (H)     Immat GRANS % 04/14/2023 0     Lymphocytes Relative 04/14/2023 6 (L)     Monocytes Relative 04/14/2023 5     Eosinophils Relative 04/14/2023 1     Basophils Relative 04/14/2023 0     Neutrophils Absolute 04/14/2023 9 38 (H)     Immature Grans Absolute 04/14/2023 0 03     Lymphocytes Absolute 04/14/2023 0 67     Monocytes Absolute 04/14/2023 0 52     Eosinophils Absolute 04/14/2023 0 07     Basophils Absolute 04/14/2023 0 03     Lipase 04/14/2023 17     Preg, Serum 04/14/2023 Negative          Radiology Results:   CT abdomen pelvis with contrast    Result Date: 4/14/2023  Narrative: CT ABDOMEN AND PELVIS WITH IV CONTRAST INDICATION:   Epigastric pain  COMPARISON:  CT abdomen pelvis 1/15/2022  TECHNIQUE:  CT examination of the abdomen and pelvis was performed  Multiplanar 2D reformatted images were created from the source data  Radiation dose length product (DLP) for this visit:  622 mGy-cm   This examination, like all CT scans performed in the [facility], was performed utilizing techniques to minimize radiation dose exposure, including the use of iterative reconstruction and automated exposure control  IV Contrast:  100 mL of iohexol (OMNIPAQUE)   Enteric Contrast:  Enteric contrast was not administered  FINDINGS: ABDOMEN LOWER CHEST:  No clinically significant abnormality identified in the visualized lower chest  LIVER/BILIARY TREE:  Liver is mildly enlarged measuring 19 9 cm in length, previously 20 9 cm in length  No focal hepatic lesions are noted  Hepatic contours are within normal limits  No biliary dilatation  GALLBLADDER:  No calcified gallstones  No pericholecystic inflammatory change  SPLEEN:  Unremarkable  PANCREAS:  Unremarkable  ADRENAL GLANDS:  Unremarkable  KIDNEYS/URETERS:  Unremarkable  No hydronephrosis  STOMACH AND BOWEL:  Unremarkable  APPENDIX:  A normal appendix was visualized  ABDOMINOPELVIC CAVITY:  No ascites  No pneumoperitoneum  No lymphadenopathy  VESSELS:  No abdominal aortic aneurysm  Circumaortic left renal vein  PELVIS REPRODUCTIVE ORGANS:  Unremarkable for patient's age  URINARY BLADDER:  Unremarkable  ABDOMINAL WALL/INGUINAL REGIONS:  Unremarkable  OSSEOUS STRUCTURES:  No acute fracture or destructive osseous lesion  Impression: 1  No acute process in the abdomen and pelvis  2   Mild hepatomegaly   Workstation performed: [id number]

## 2023-05-03 NOTE — PROGRESS NOTES
[facility] - Outpatient Follow-up Note  Kristen Nolen 33 y o  female MRN: [id number]  Encounter: [id number]          ASSESSMENT AND PLAN:      1  Epigastric pain  2  Nausea and vomiting, unspecified vomiting type  -At this time we will plan a right upper quadrant ultrasound     -Patient will continue pantoprazole 40 mg daily   -We will check food allergy panel     -Follow-up in 6 to 8 weeks  ______________________________________________________________________    SUBJECTIVE:    26-year-old female presents to the GI clinic today for follow-up  Patient was actually evaluated a little over a year ago for very similar symptoms  Patient has an acute onset of epigastric abdominal burning with associated nausea and vomiting that was on and off and worsened over 24 to 48 hours     She reports that she was symptomatic for several days before she started feeling better  She reports that she did have an episode of diarrhea during this time  She reports again that she had a very similar presentation about a year ago  She reports that the only thing that she has now realizes that she is allergic to pineapple  She reports that she was given dicyclomine in the emergency department with no benefit  She reports that she had some pantoprazole at home that actually helped her  Patient denies any specific dietary triggers  Patient denies any stress triggers  Laboratories in the emergency department did show a slightly elevated bilirubin level as well as a slightly elevated white blood cell count  Patient CT showed no acute intra-abdominal abnormality  REVIEW OF SYSTEMS IS OTHERWISE NEGATIVE        Historical Information   Past Medical History:   Diagnosis Date    Cardiac disorder     Mild tricuspid insufficiency      Past Surgical History:   Procedure Laterality Date    ARTHROSCOPIC REPAIR ACL Left 01/12/2018    NO PAST SURGERIES       Social History   Social History     Substance "and Sexual Activity   Alcohol Use Yes     Social History     Substance and Sexual Activity   Drug Use No     Social History     Tobacco Use   Smoking Status Never   Smokeless Tobacco Never     Family History   Problem Relation Age of Onset    No Known Problems Mother     No Known Problems Father     Breast cancer Paternal Grandmother 68    Diabetes Paternal Grandfather     Kidney disease Paternal Grandfather     Diabetes Family     Hiatal hernia Family     Hypertension Family     Colon cancer Neg Hx     Ovarian cancer Neg Hx     Uterine cancer Neg Hx     Cervical cancer Neg Hx        Meds/Allergies       Current Outpatient Medications:     albuterol (VENTOLIN HFA) 90 mcg/act inhaler    loratadine (CLARITIN) 10 mg tablet    norethindrone-ethinyl estradiol (Junel FE 1/20) 1-20 MG-MCG per tablet    Allergies   Allergen Reactions    Pineapple - Food Allergy Abdominal Pain, Diarrhea and Itching           Objective     Blood pressure 113/79, pulse 95, height 5' 5 5\" (1 664 m), weight 62 6 kg (138 lb), SpO2 100 %, not currently breastfeeding  Body mass index is 22 62 kg/m²  PHYSICAL EXAM:      General Appearance:   Alert, cooperative, no distress   HEENT:   Normocephalic, atraumatic, anicteric      Neck:  Supple, symmetrical, trachea midline   Lungs:   Clear to auscultation bilaterally; no rales, rhonchi or wheezing; respirations unlabored    Heart[de-identified]   Regular rate and rhythm; no murmur, rub, or gallop  Abdomen:   Soft, non-tender, non-distended; normal bowel sounds; no masses, no organomegaly    Genitalia:   Deferred    Rectal:   Deferred    Extremities:  No cyanosis, clubbing or edema    Pulses:  2+ and symmetric    Skin:  No jaundice, rashes, or lesions    Lymph nodes:  No palpable cervical lymphadenopathy        Lab Results:   No visits with results within 1 Day(s) from this visit     Latest known visit with results is:   Admission on 04/14/2023, Discharged on 04/14/2023   Component Date Value    " Sodium 04/14/2023 137     Potassium 04/14/2023 4 4     Chloride 04/14/2023 103     CO2 04/14/2023 24     ANION GAP 04/14/2023 10     BUN 04/14/2023 12     Creatinine 04/14/2023 0 71     Glucose 04/14/2023 97     Calcium 04/14/2023 9 5     AST 04/14/2023 14     ALT 04/14/2023 17     Alkaline Phosphatase 04/14/2023 53     Total Protein 04/14/2023 7 7     Albumin 04/14/2023 4 4     Total Bilirubin 04/14/2023 1 02 (H)     eGFR 04/14/2023 117     WBC 04/14/2023 10 70 (H)     RBC 04/14/2023 4 83     Hemoglobin 04/14/2023 14 0     Hematocrit 04/14/2023 42 7     MCV 04/14/2023 88     MCH 04/14/2023 29 0     MCHC 04/14/2023 32 8     RDW 04/14/2023 12 1     MPV 04/14/2023 9 8     Platelets 94/10/5590 323     nRBC 04/14/2023 0     Neutrophils Relative 04/14/2023 88 (H)     Immat GRANS % 04/14/2023 0     Lymphocytes Relative 04/14/2023 6 (L)     Monocytes Relative 04/14/2023 5     Eosinophils Relative 04/14/2023 1     Basophils Relative 04/14/2023 0     Neutrophils Absolute 04/14/2023 9 38 (H)     Immature Grans Absolute 04/14/2023 0 03     Lymphocytes Absolute 04/14/2023 0 67     Monocytes Absolute 04/14/2023 0 52     Eosinophils Absolute 04/14/2023 0 07     Basophils Absolute 04/14/2023 0 03     Lipase 04/14/2023 17     Preg, Serum 04/14/2023 Negative          Radiology Results:   CT abdomen pelvis with contrast    Result Date: 4/14/2023  Narrative: CT ABDOMEN AND PELVIS WITH IV CONTRAST INDICATION:   Epigastric pain  COMPARISON:  CT abdomen pelvis 1/15/2022  TECHNIQUE:  CT examination of the abdomen and pelvis was performed  Multiplanar 2D reformatted images were created from the source data  Radiation dose length product (DLP) for this visit:  622 mGy-cm     This examination, like all CT scans performed in the [facility], was performed utilizing techniques to minimize radiation dose exposure, including the use of iterative reconstruction and automated exposure control  IV Contrast:  100 mL of iohexol (OMNIPAQUE)   Enteric Contrast:  Enteric contrast was not administered  FINDINGS: ABDOMEN LOWER CHEST:  No clinically significant abnormality identified in the visualized lower chest  LIVER/BILIARY TREE:  Liver is mildly enlarged measuring 19 9 cm in length, previously 20 9 cm in length  No focal hepatic lesions are noted  Hepatic contours are within normal limits  No biliary dilatation  GALLBLADDER:  No calcified gallstones  No pericholecystic inflammatory change  SPLEEN:  Unremarkable  PANCREAS:  Unremarkable  ADRENAL GLANDS:  Unremarkable  KIDNEYS/URETERS:  Unremarkable  No hydronephrosis  STOMACH AND BOWEL:  Unremarkable  APPENDIX:  A normal appendix was visualized  ABDOMINOPELVIC CAVITY:  No ascites  No pneumoperitoneum  No lymphadenopathy  VESSELS:  No abdominal aortic aneurysm  Circumaortic left renal vein  PELVIS REPRODUCTIVE ORGANS:  Unremarkable for patient's age  URINARY BLADDER:  Unremarkable  ABDOMINAL WALL/INGUINAL REGIONS:  Unremarkable  OSSEOUS STRUCTURES:  No acute fracture or destructive osseous lesion  Impression: 1  No acute process in the abdomen and pelvis  2   Mild hepatomegaly   Workstation performed: [id number]

## 2023-05-10 ENCOUNTER — HOSPITAL ENCOUNTER (OUTPATIENT)
Dept: ULTRASOUND IMAGING | Facility: CLINIC | Age: 28
Discharge: HOME | End: 2023-05-10

## 2023-05-10 ENCOUNTER — APPOINTMENT (OUTPATIENT)
Dept: LAB | Facility: HOSPITAL | Age: 28
End: 2023-05-10

## 2023-05-10 DIAGNOSIS — R10.13 EPIGASTRIC PAIN: ICD-10-CM

## 2023-05-12 LAB
A-LACTALB IGE QN: <0.1 KAU/I
ALMOND IGE QN: 0.12 KUA/I
ARA H6 PEANUT: <0.1 KUA/I
B-LACTOGLOB IGE QN: 0.32 KAU/I
CASEIN IGE QN: <0.1 KAU/I
CASHEW NUT IGE QN: <0.1 KUA/I
CODFISH IGE QN: <0.1 KUA/I
EGG WHITE IGE QN: 0.72 KUA/I
GLUTEN IGE QN: 0.18 KUA/I
HAZELNUT IGE QN: 0.2 KUA/L
MILK IGE QN: 0.4 KUA/I
OVALB IGE QN: 0.5 KAU/I
OVOMUCOID IGE QN: 0.25 KAU/I
PEANUT (RARA H) 1 IGE QN: <0.1 KUA/I
PEANUT (RARA H) 2 IGE QN: <0.1 KUA/I
PEANUT (RARA H) 3 IGE QN: <0.1 KUA/I
PEANUT (RARA H) 8 IGE QN: <0.1 KUA/I
PEANUT (RARA H) 9 IGE QN: <0.1 KUA/I
PEANUT IGE QN: 2.1 KUA/I
SALMON IGE QN: <0.1 KUA/I
SCALLOP IGE QN: <0.1 KUA/L
SESAME SEED IGE QN: 0.89 KUA/I
SHRIMP IGE QN: 0.11 KUA/L
SOYBEAN IGE QN: 0.18 KUA/I
TOTAL IGE SMQN RAST: 95.8 KU/L (ref 0–113)
TUNA IGE QN: <0.1 KUA/I
WALNUT IGE QN: 0.13 KUA/I
WHEAT IGE QN: 0.3 KUA/I

## 2023-05-16 ENCOUNTER — TELEPHONE (OUTPATIENT)
Dept: GASTROENTEROLOGY | Facility: CLINIC | Age: 28
End: 2023-05-16

## 2023-05-16 DIAGNOSIS — Z91.018 FOOD ALLERGY: Primary | ICD-10-CM

## 2023-05-16 NOTE — TELEPHONE ENCOUNTER
Patients GI provider:  Susannah Horne    Number to return call: NA    Reason for call: Radiology calling with Significant Findings on US RUQ    Scheduled procedure/appointment date if applicable: Apt/procedure 5/10/23

## 2023-05-17 ENCOUNTER — TELEPHONE (OUTPATIENT)
Dept: GASTROENTEROLOGY | Facility: CLINIC | Age: 28
End: 2023-05-17

## 2023-05-17 DIAGNOSIS — Q63.9 STRUCTURAL ABNORMALITY OF KIDNEY: Primary | ICD-10-CM

## 2023-05-17 NOTE — TELEPHONE ENCOUNTER
----- Message from Germaine Long PA-C sent at 5/17/2023  9:38 AM EDT -----  Please inform patient that her right upper quadrant ultrasound does not show any evidence of gallstones or liver abnormality  It does show an abnormality on her right kidney and we will need to perform a renal ultrasound    Thank you

## 2023-05-23 ENCOUNTER — HOSPITAL ENCOUNTER (OUTPATIENT)
Dept: ULTRASOUND IMAGING | Facility: CLINIC | Age: 28
Discharge: HOME | End: 2023-05-23

## 2023-05-23 DIAGNOSIS — Q63.9 STRUCTURAL ABNORMALITY OF KIDNEY: ICD-10-CM

## 2023-07-26 ENCOUNTER — TELEPHONE (OUTPATIENT)
Dept: GASTROENTEROLOGY | Facility: CLINIC | Age: 28
End: 2023-07-26

## 2023-08-21 ENCOUNTER — TELEPHONE (OUTPATIENT)
Dept: DERMATOLOGY | Facility: CLINIC | Age: 28
End: 2023-08-21

## 2023-08-21 NOTE — TELEPHONE ENCOUNTER
Called patient regarding her appt with Dr. Katiuska Pacheco for tomorrow. Pt scheduled appt via the portal and it is for a cosmetic consult. LM to advise that this would not be covered under insurance and will be $150 out of pocket. Also advised that Dr. Steffen Salvador is our cosmetic specialist. Asked pt to call the office to confirm that she is keeping this appt.

## 2023-09-27 ENCOUNTER — OFFICE VISIT (OUTPATIENT)
Dept: GASTROENTEROLOGY | Facility: CLINIC | Age: 28
End: 2023-09-27
Payer: COMMERCIAL

## 2023-09-27 VITALS
HEIGHT: 66 IN | OXYGEN SATURATION: 98 % | SYSTOLIC BLOOD PRESSURE: 110 MMHG | WEIGHT: 136.2 LBS | HEART RATE: 75 BPM | DIASTOLIC BLOOD PRESSURE: 72 MMHG | BODY MASS INDEX: 21.89 KG/M2

## 2023-09-27 DIAGNOSIS — K21.9 GASTROESOPHAGEAL REFLUX DISEASE WITHOUT ESOPHAGITIS: ICD-10-CM

## 2023-09-27 DIAGNOSIS — R14.0 BLOATING: ICD-10-CM

## 2023-09-27 DIAGNOSIS — R10.13 EPIGASTRIC PAIN: Primary | ICD-10-CM

## 2023-09-27 PROCEDURE — 99213 OFFICE O/P EST LOW 20 MIN: CPT | Performed by: PHYSICIAN ASSISTANT

## 2023-09-27 NOTE — H&P (VIEW-ONLY)
Answers for HPI/ROS submitted by the patient on 9/25/2023  Chronicity: recurrent  Onset: more than 1 year ago  Onset quality: sudden  Frequency: every several days  Episode duration: 3 Hours  Progression since onset: gradually improving  Pain location: epigastric region  Pain - numeric: 6/10  Pain quality: burning, cramping, sharp  Radiates to: does not radiate  anorexia: Yes  arthralgias: No  belching: Yes  constipation: No  diarrhea: Yes  dysuria: No  fever: No  flatus: Yes  frequency: No  headaches: No  hematochezia: No  hematuria: No  melena: No  myalgias: No  nausea: Yes  weight loss: No  vomiting: Yes  Aggravated by: certain positions, movement  Relieved by: belching, bowel movements, passing flatus, vomiting  Diagnostic workup: CT scan, ultrasound    [facility] - Outpatient Follow-up Note  Kristen Garcia Reach 33 y.o. female MRN: [id number]  Encounter: [id number]          ASSESSMENT AND PLAN:      1. Epigastric pain  2. Gastroesophageal reflux disease without esophagitis  3. Bloating  -Patient will be scheduled for an EGD with biopsy.    -Patient will call and schedule a HIDA scan with [facility]. -Will begin Pepcid as needed    -Will begin probiotic therapy    -Follow-up appointment after all testing is complete.    ______________________________________________________________________    SUBJECTIVE:    58-year-old female with a past medical history significant for GERD who presents to the GI clinic today for follow-up. Patient has suffered with ongoing episodes of epigastric abdominal pain, GERD, abdominal bloating, and episodes of extreme nausea, diarrhea and vomiting. Since patient's last appointment she did have food allergy testing and did follow-up with the allergist.  Patient is still reporting ongoing symptoms of abdominal gas, bloating, burning abdomen. Patient has had a right upper quadrant ultrasound ruling out gallbladder pathology.   She reports that it was recommended for her to start taking pantoprazole which she has not been doing consistently but does not feel this medication is helping her. She is questioning whether she should start a probiotic at this time. Patient has never had any endoscopic evaluation in the past.  Patient does report that certain foods will trigger her abdominal burning such as spicy foods. Patient denies any alarm symptoms. REVIEW OF SYSTEMS IS OTHERWISE NEGATIVE. Historical Information   Past Medical History:   Diagnosis Date   • Cardiac disorder     Mild tricuspid insufficiency      Past Surgical History:   Procedure Laterality Date   • ARTHROSCOPIC REPAIR ACL Left 01/12/2018   • NO PAST SURGERIES       Social History   Social History     Substance and Sexual Activity   Alcohol Use Yes     Social History     Substance and Sexual Activity   Drug Use No     Social History     Tobacco Use   Smoking Status Never   Smokeless Tobacco Never     Family History   Problem Relation Age of Onset   • No Known Problems Mother    • No Known Problems Father    • Breast cancer Paternal Grandmother 68   • Diabetes Paternal Grandfather    • Kidney disease Paternal Grandfather    • Diabetes Family    • Hiatal hernia Family    • Hypertension Family    • Colon cancer Neg Hx    • Ovarian cancer Neg Hx    • Uterine cancer Neg Hx    • Cervical cancer Neg Hx        Meds/Allergies       Current Outpatient Medications:   •  albuterol (VENTOLIN HFA) 90 mcg/act inhaler  •  loratadine (CLARITIN) 10 mg tablet  •  norethindrone-ethinyl estradiol (Junel FE 1/20) 1-20 MG-MCG per tablet    Allergies   Allergen Reactions   • Pineapple - Food Allergy Abdominal Pain, Diarrhea and Itching           Objective     Blood pressure 110/72, pulse 75, height 5' 5.5" (1.664 m), weight 61.8 kg (136 lb 3.2 oz), SpO2 98 %, not currently breastfeeding. Body mass index is 22.32 kg/m².       PHYSICAL EXAM:      General Appearance:   Alert, cooperative, no distress   HEENT:   Normocephalic, atraumatic, anicteric.     Neck:  Supple, symmetrical, trachea midline   Lungs:   Clear to auscultation bilaterally; no rales, rhonchi or wheezing; respirations unlabored    Heart[de-identified]   Regular rate and rhythm; no murmur, rub, or gallop. Abdomen:   Soft, non-tender, non-distended; normal bowel sounds; no masses, no organomegaly    Genitalia:   Deferred    Rectal:   Deferred    Extremities:  No cyanosis, clubbing or edema    Pulses:  2+ and symmetric    Skin:  No jaundice, rashes, or lesions    Lymph nodes:  No palpable cervical lymphadenopathy        Lab Results:   No visits with results within 1 Day(s) from this visit. Latest known visit with results is:   Appointment on 05/10/2023   Component Date Value   • Fish Cod 05/10/2023 <0.10    • Egg White 05/10/2023 0.72 (H)    • Gluten 05/10/2023 0.18 (H)    • Milk, Cow's 05/10/2023 0.40 (H)    • Peanut 05/10/2023 2.10 (H)    • Britt 05/10/2023 <0.10    • Scallop IgE 05/10/2023 <0.10    • Sesame Seed IgE 05/10/2023 0.89 (H)    • Shrimp 05/10/2023 0.11 (H)    • SOYBEAN 05/10/2023 0.18 (H)    • Tuna IgE 05/10/2023 <0.10    • Foss 05/10/2023 0.13 (H)    • Wheat 05/10/2023 0.30 (H)    • Almonds 05/10/2023 0.12 (H)    • Cashew 05/10/2023 <0.10    • Hazelnut 05/10/2023 0.20 (H)    • IgE 05/10/2023 95.8    • Alpha Lactalbumin 05/10/2023 <0.10    • Beta Lactoglobulin 05/10/2023 0.32 (H)    • Casein 05/10/2023 <0.10    • MIKE h1 Peanut 05/10/2023 <0.10    • MIKE h2 Peanut 05/10/2023 <0.10    • MIKE h3 Peanut 05/10/2023 <0.10    • MIKE h6 Peanut 05/10/2023 <0.10    • MIKE h8 Peanut 05/10/2023 <0.10    • MIKE h9 Peanut 05/10/2023 <0.10    • F232 Ovalbumin 05/10/2023 0.50 (H)    • F233 Ovomucoid 05/10/2023 0.25 (H)          Radiology Results:   No results found.

## 2023-09-27 NOTE — PROGRESS NOTES
Answers for HPI/ROS submitted by the patient on 9/25/2023  Chronicity: recurrent  Onset: more than 1 year ago  Onset quality: sudden  Frequency: every several days  Episode duration: 3 Hours  Progression since onset: gradually improving  Pain location: epigastric region  Pain - numeric: 6/10  Pain quality: burning, cramping, sharp  Radiates to: does not radiate  anorexia: Yes  arthralgias: No  belching: Yes  constipation: No  diarrhea: Yes  dysuria: No  fever: No  flatus: Yes  frequency: No  headaches: No  hematochezia: No  hematuria: No  melena: No  myalgias: No  nausea: Yes  weight loss: No  vomiting: Yes  Aggravated by: certain positions, movement  Relieved by: belching, bowel movements, passing flatus, vomiting  Diagnostic workup: CT scan, ultrasound    [facility] - Outpatient Follow-up Note  Kristen Thomas 33 y.o. female MRN: [id number]  Encounter: [id number]          ASSESSMENT AND PLAN:      1. Epigastric pain  2. Gastroesophageal reflux disease without esophagitis  3. Bloating  -Patient will be scheduled for an EGD with biopsy.    -Patient will call and schedule a HIDA scan with [facility]. -Will begin Pepcid as needed    -Will begin probiotic therapy    -Follow-up appointment after all testing is complete.    ______________________________________________________________________    SUBJECTIVE:    50-year-old female with a past medical history significant for GERD who presents to the GI clinic today for follow-up. Patient has suffered with ongoing episodes of epigastric abdominal pain, GERD, abdominal bloating, and episodes of extreme nausea, diarrhea and vomiting. Since patient's last appointment she did have food allergy testing and did follow-up with the allergist.  Patient is still reporting ongoing symptoms of abdominal gas, bloating, burning abdomen. Patient has had a right upper quadrant ultrasound ruling out gallbladder pathology.   She reports that it was recommended for her to start taking pantoprazole which she has not been doing consistently but does not feel this medication is helping her. She is questioning whether she should start a probiotic at this time. Patient has never had any endoscopic evaluation in the past.  Patient does report that certain foods will trigger her abdominal burning such as spicy foods. Patient denies any alarm symptoms. REVIEW OF SYSTEMS IS OTHERWISE NEGATIVE. Historical Information   Past Medical History:   Diagnosis Date   • Cardiac disorder     Mild tricuspid insufficiency      Past Surgical History:   Procedure Laterality Date   • ARTHROSCOPIC REPAIR ACL Left 01/12/2018   • NO PAST SURGERIES       Social History   Social History     Substance and Sexual Activity   Alcohol Use Yes     Social History     Substance and Sexual Activity   Drug Use No     Social History     Tobacco Use   Smoking Status Never   Smokeless Tobacco Never     Family History   Problem Relation Age of Onset   • No Known Problems Mother    • No Known Problems Father    • Breast cancer Paternal Grandmother 68   • Diabetes Paternal Grandfather    • Kidney disease Paternal Grandfather    • Diabetes Family    • Hiatal hernia Family    • Hypertension Family    • Colon cancer Neg Hx    • Ovarian cancer Neg Hx    • Uterine cancer Neg Hx    • Cervical cancer Neg Hx        Meds/Allergies       Current Outpatient Medications:   •  albuterol (VENTOLIN HFA) 90 mcg/act inhaler  •  loratadine (CLARITIN) 10 mg tablet  •  norethindrone-ethinyl estradiol (Junel FE 1/20) 1-20 MG-MCG per tablet    Allergies   Allergen Reactions   • Pineapple - Food Allergy Abdominal Pain, Diarrhea and Itching           Objective     Blood pressure 110/72, pulse 75, height 5' 5.5" (1.664 m), weight 61.8 kg (136 lb 3.2 oz), SpO2 98 %, not currently breastfeeding. Body mass index is 22.32 kg/m².       PHYSICAL EXAM:      General Appearance:   Alert, cooperative, no distress   HEENT:   Normocephalic, atraumatic, anicteric.     Neck:  Supple, symmetrical, trachea midline   Lungs:   Clear to auscultation bilaterally; no rales, rhonchi or wheezing; respirations unlabored    Heart[de-identified]   Regular rate and rhythm; no murmur, rub, or gallop. Abdomen:   Soft, non-tender, non-distended; normal bowel sounds; no masses, no organomegaly    Genitalia:   Deferred    Rectal:   Deferred    Extremities:  No cyanosis, clubbing or edema    Pulses:  2+ and symmetric    Skin:  No jaundice, rashes, or lesions    Lymph nodes:  No palpable cervical lymphadenopathy        Lab Results:   No visits with results within 1 Day(s) from this visit. Latest known visit with results is:   Appointment on 05/10/2023   Component Date Value   • Fish Cod 05/10/2023 <0.10    • Egg White 05/10/2023 0.72 (H)    • Gluten 05/10/2023 0.18 (H)    • Milk, Cow's 05/10/2023 0.40 (H)    • Peanut 05/10/2023 2.10 (H)    • [place] 05/10/2023 <0.10    • Scallop IgE 05/10/2023 <0.10    • Sesame Seed IgE 05/10/2023 0.89 (H)    • Shrimp 05/10/2023 0.11 (H)    • SOYBEAN 05/10/2023 0.18 (H)    • Tuna IgE 05/10/2023 <0.10    • [place] 05/10/2023 0.13 (H)    • Wheat 05/10/2023 0.30 (H)    • Almonds 05/10/2023 0.12 (H)    • Cashew 05/10/2023 <0.10    • Hazelnut 05/10/2023 0.20 (H)    • IgE 05/10/2023 95.8    • Alpha Lactalbumin 05/10/2023 <0.10    • Beta Lactoglobulin 05/10/2023 0.32 (H)    • Casein 05/10/2023 <0.10    • MIKE h1 Peanut 05/10/2023 <0.10    • MIKE h2 Peanut 05/10/2023 <0.10    • MIKE h3 Peanut 05/10/2023 <0.10    • MIKE h6 Peanut 05/10/2023 <0.10    • MIKE h8 Peanut 05/10/2023 <0.10    • MIKE h9 Peanut 05/10/2023 <0.10    • F232 Ovalbumin 05/10/2023 0.50 (H)    • F233 Ovomucoid 05/10/2023 0.25 (H)          Radiology Results:   No results found.

## 2023-09-27 NOTE — PATIENT INSTRUCTIONS
Scheduled date of EGD(as of today):10/7/23  Physician performing EGD:Enma  Location of EGD:[place]  Instructions reviewed with patient by:Richard husain  Clearances:  none

## 2023-10-05 ENCOUNTER — TELEPHONE (OUTPATIENT)
Age: 28
End: 2023-10-05

## 2023-10-05 NOTE — TELEPHONE ENCOUNTER
Spoke with Kristen confirmed EGD with Dr. Lexie Krishna on 10/07/2023. Went over the three reminders. Patient is Ready to Go!

## 2023-10-06 RX ORDER — SODIUM CHLORIDE, SODIUM LACTATE, POTASSIUM CHLORIDE, CALCIUM CHLORIDE 600; 310; 30; 20 MG/100ML; MG/100ML; MG/100ML; MG/100ML
125 INJECTION, SOLUTION INTRAVENOUS CONTINUOUS
Status: CANCELLED | OUTPATIENT
Start: 2023-10-06

## 2023-10-07 ENCOUNTER — ANESTHESIA (OUTPATIENT)
Dept: GASTROENTEROLOGY | Facility: HOSPITAL | Age: 28
End: 2023-10-07

## 2023-10-07 ENCOUNTER — HOSPITAL ENCOUNTER (OUTPATIENT)
Dept: GASTROENTEROLOGY | Facility: HOSPITAL | Age: 28
Setting detail: OUTPATIENT SURGERY
Discharge: HOME | End: 2023-10-07
Admitting: INTERNAL MEDICINE
Payer: COMMERCIAL

## 2023-10-07 ENCOUNTER — ANESTHESIA EVENT (OUTPATIENT)
Dept: GASTROENTEROLOGY | Facility: HOSPITAL | Age: 28
End: 2023-10-07

## 2023-10-07 VITALS
DIASTOLIC BLOOD PRESSURE: 73 MMHG | RESPIRATION RATE: 18 BRPM | HEART RATE: 59 BPM | OXYGEN SATURATION: 100 % | SYSTOLIC BLOOD PRESSURE: 114 MMHG | TEMPERATURE: 98.2 F | BODY MASS INDEX: 21.79 KG/M2 | WEIGHT: 135.58 LBS | HEIGHT: 66 IN

## 2023-10-07 DIAGNOSIS — R10.13 EPIGASTRIC PAIN: ICD-10-CM

## 2023-10-07 LAB
EXT PREGNANCY TEST URINE: NEGATIVE
EXT. CONTROL: NORMAL

## 2023-10-07 PROCEDURE — 81025 URINE PREGNANCY TEST: CPT | Performed by: STUDENT IN AN ORGANIZED HEALTH CARE EDUCATION/TRAINING PROGRAM

## 2023-10-07 PROCEDURE — 88305 TISSUE EXAM BY PATHOLOGIST: CPT | Performed by: PATHOLOGY

## 2023-10-07 RX ORDER — SODIUM CHLORIDE, SODIUM LACTATE, POTASSIUM CHLORIDE, CALCIUM CHLORIDE 600; 310; 30; 20 MG/100ML; MG/100ML; MG/100ML; MG/100ML
INJECTION, SOLUTION INTRAVENOUS CONTINUOUS PRN
Status: DISCONTINUED | OUTPATIENT
Start: 2023-10-07 | End: 2023-10-07

## 2023-10-07 RX ORDER — PROPOFOL 10 MG/ML
INJECTION, EMULSION INTRAVENOUS AS NEEDED
Status: DISCONTINUED | OUTPATIENT
Start: 2023-10-07 | End: 2023-10-07

## 2023-10-07 RX ORDER — LIDOCAINE HYDROCHLORIDE 20 MG/ML
INJECTION, SOLUTION EPIDURAL; INFILTRATION; INTRACAUDAL; PERINEURAL AS NEEDED
Status: DISCONTINUED | OUTPATIENT
Start: 2023-10-07 | End: 2023-10-07

## 2023-10-07 RX ADMIN — PROPOFOL 150 MG: 10 INJECTION, EMULSION INTRAVENOUS at 09:37

## 2023-10-07 RX ADMIN — SODIUM CHLORIDE, SODIUM LACTATE, POTASSIUM CHLORIDE, AND CALCIUM CHLORIDE: .6; .31; .03; .02 INJECTION, SOLUTION INTRAVENOUS at 08:13

## 2023-10-07 RX ADMIN — SODIUM CHLORIDE, SODIUM LACTATE, POTASSIUM CHLORIDE, AND CALCIUM CHLORIDE: .6; .31; .03; .02 INJECTION, SOLUTION INTRAVENOUS at 09:41

## 2023-10-07 RX ADMIN — PROPOFOL 50 MG: 10 INJECTION, EMULSION INTRAVENOUS at 09:41

## 2023-10-07 RX ADMIN — PROPOFOL 50 MG: 10 INJECTION, EMULSION INTRAVENOUS at 09:39

## 2023-10-07 RX ADMIN — LIDOCAINE HYDROCHLORIDE 60 MG: 20 INJECTION, SOLUTION EPIDURAL; INFILTRATION; INTRACAUDAL; PERINEURAL at 09:37

## 2023-10-07 RX ADMIN — SODIUM CHLORIDE, SODIUM LACTATE, POTASSIUM CHLORIDE, AND CALCIUM CHLORIDE: .6; .31; .03; .02 INJECTION, SOLUTION INTRAVENOUS at 09:33

## 2023-10-07 NOTE — ANESTHESIA POSTPROCEDURE EVALUATION
Post-Op Assessment Note    CV Status:  Stable  Pain Score: 0    Pain management: adequate     Mental Status:  Sleepy   Airway Patency:  Patent      Post Op Vitals Reviewed: Yes      Staff: CRNA         No notable events documented.     /66 (10/07/23 0947)    Temp 98.2 °F (36.8 °C) (10/07/23 0947)    Pulse 66 (10/07/23 0947)   Resp 15 (10/07/23 0947)    SpO2 99 % (10/07/23 0947)

## 2023-10-07 NOTE — INTERVAL H&P NOTE
H&P reviewed. After examining the patient I find no changes in the patients condition since the H&P had been written.     Vitals:    10/07/23 0803   BP: 138/71   Pulse: 71   Resp: 19   Temp: 97.8 °F (36.6 °C)   SpO2: 100%

## 2023-10-07 NOTE — ANESTHESIA PREPROCEDURE EVALUATION
Procedure:  EGD    Relevant Problems   MUSCULOSKELETAL   (+) Exercise-induced bronchospasm        Physical Exam    Airway    Mallampati score: I  TM Distance: >3 FB  Neck ROM: full     Dental   No notable dental hx     Cardiovascular      Pulmonary      Other Findings        Anesthesia Plan  ASA Score- 2     Anesthesia Type- IV sedation with anesthesia with ASA Monitors. Additional Monitors:   Airway Plan:           Plan Factors-Exercise tolerance (METS): >4 METS. Chart reviewed. Existing labs reviewed. Patient summary reviewed. Patient is not a current smoker. There is medical exclusion for perioperative obstructive sleep apnea risk education. Induction- intravenous. Postoperative Plan-     Informed Consent- Anesthetic plan and risks discussed with patient. I personally reviewed this patient with the CRNA. Discussed and agreed on the Anesthesia Plan with the CRNA. Luther Valerio

## 2023-10-12 PROCEDURE — 88305 TISSUE EXAM BY PATHOLOGIST: CPT | Performed by: PATHOLOGY

## 2023-10-16 ENCOUNTER — HOSPITAL ENCOUNTER (OUTPATIENT)
Dept: NUCLEAR MEDICINE | Facility: HOSPITAL | Age: 28
Discharge: HOME | End: 2023-10-16
Payer: COMMERCIAL

## 2023-10-16 DIAGNOSIS — R10.13 EPIGASTRIC PAIN: ICD-10-CM

## 2023-10-16 PROCEDURE — A9537 TC99M MEBROFENIN: HCPCS

## 2023-10-16 PROCEDURE — 78227 HEPATOBIL SYST IMAGE W/DRUG: CPT

## 2023-10-16 PROCEDURE — G1004 CDSM NDSC: HCPCS

## 2023-10-16 RX ORDER — SINCALIDE 5 UG/5ML
0.02 INJECTION, POWDER, LYOPHILIZED, FOR SOLUTION INTRAVENOUS ONCE
Status: COMPLETED | OUTPATIENT
Start: 2023-10-16 | End: 2023-10-16

## 2023-10-16 RX ADMIN — SINCALIDE 1.2 MCG: 5 INJECTION, POWDER, LYOPHILIZED, FOR SOLUTION INTRAVENOUS at 11:10

## 2023-12-28 ENCOUNTER — OFFICE VISIT (OUTPATIENT)
Dept: GASTROENTEROLOGY | Facility: CLINIC | Age: 28
End: 2023-12-28
Payer: COMMERCIAL

## 2023-12-28 VITALS
OXYGEN SATURATION: 99 % | BODY MASS INDEX: 22.53 KG/M2 | HEART RATE: 78 BPM | WEIGHT: 140.2 LBS | DIASTOLIC BLOOD PRESSURE: 78 MMHG | SYSTOLIC BLOOD PRESSURE: 131 MMHG | HEIGHT: 66 IN

## 2023-12-28 DIAGNOSIS — K21.9 GASTROESOPHAGEAL REFLUX DISEASE WITHOUT ESOPHAGITIS: ICD-10-CM

## 2023-12-28 DIAGNOSIS — R14.0 BLOATING: ICD-10-CM

## 2023-12-28 DIAGNOSIS — R10.13 EPIGASTRIC PAIN: Primary | ICD-10-CM

## 2023-12-28 PROCEDURE — 99213 OFFICE O/P EST LOW 20 MIN: CPT | Performed by: PHYSICIAN ASSISTANT

## 2023-12-28 NOTE — PROGRESS NOTES
Answers submitted by the patient for this visit:  Abdominal Pain Questionnaire (Submitted on 12/27/2023)  Chief Complaint: Abdominal pain  Progression since onset: gradually improving  [facility] - Outpatient Follow-up Note  Kristen Lanza 28 y.o. female MRN: [id number]  Encounter: [id number]          ASSESSMENT AND PLAN:      1. Epigastric pain  2. Gastroesophageal reflux disease without esophagitis  3. Bloating  -Patient is significantly improved!!    -Continue align probiotic daily.    -Continue to utilize Pepcid as needed.    -Encouraged daily exercise.    -Encouraged increased water intake and well-balanced diet.  ______________________________________________________________________    SUBJECTIVE:    28-year-old female who presents to the GI clinic today for follow-up.  Since patient's last appointment in September she did start taking align probiotic daily.  She reports that all of her symptoms are significantly improved.  She reports that she no longer is suffering with epigastric abdominal pain, GERD, bloating, diarrhea, and/or constipation.  She denies any alarm symptoms.  She reports that she is eating a well-balanced diet.    EGD from September 2023 was essentially unremarkable.  HIDA scan from 10/16/2023 showed ejection fraction of 60%  Right upper quadrant ultrasound from 5/10/2023 was normal.    REVIEW OF SYSTEMS IS OTHERWISE NEGATIVE.      Historical Information   Past Medical History:   Diagnosis Date    Asthma     sports induced    Cardiac disorder     Mild tricuspid insufficiency      Past Surgical History:   Procedure Laterality Date    ARTHROSCOPIC REPAIR ACL Left 01/12/2018     Social History   Social History     Substance and Sexual Activity   Alcohol Use Yes    Comment: social     Social History     Substance and Sexual Activity   Drug Use No     Social History     Tobacco Use   Smoking Status Never   Smokeless Tobacco Never     Family History   Problem  "Relation Age of Onset    No Known Problems Mother     No Known Problems Father     Breast cancer Paternal Grandmother 76    Diabetes Paternal Grandfather     Kidney disease Paternal Grandfather     Diabetes Family     Hiatal hernia Family     Hypertension Family     Colon cancer Neg Hx     Ovarian cancer Neg Hx     Uterine cancer Neg Hx     Cervical cancer Neg Hx        Meds/Allergies       Current Outpatient Medications:     albuterol (VENTOLIN HFA) 90 mcg/act inhaler    loratadine (CLARITIN) 10 mg tablet    norethindrone-ethinyl estradiol (Junel FE 1/20) 1-20 MG-MCG per tablet    Allergies   Allergen Reactions    Pineapple - Food Allergy Abdominal Pain, Diarrhea and Itching           Objective     Blood pressure 131/78, pulse 78, height 5' 6\" (1.676 m), weight 63.6 kg (140 lb 3.2 oz), SpO2 99%, not currently breastfeeding. Body mass index is 22.63 kg/m².      PHYSICAL EXAM:      General Appearance:   Alert, cooperative, no distress   HEENT:   Normocephalic, atraumatic, anicteric.     Neck:  Supple, symmetrical, trachea midline   Lungs:   Clear to auscultation bilaterally; no rales, rhonchi or wheezing; respirations unlabored    Heart::   Regular rate and rhythm; no murmur, rub, or gallop.   Abdomen:   Soft, non-tender, non-distended; normal bowel sounds; no masses, no organomegaly    Genitalia:   Deferred    Rectal:   Deferred    Extremities:  No cyanosis, clubbing or edema    Pulses:  2+ and symmetric    Skin:  No jaundice, rashes, or lesions    Lymph nodes:  No palpable cervical lymphadenopathy        Lab Results:   No visits with results within 1 Day(s) from this visit.   Latest known visit with results is:   Hospital Outpatient Visit on 10/07/2023   Component Date Value    EXT Preg Test, Ur 10/07/2023 Negative     Control 10/07/2023 Valid     Case Report 10/07/2023                      Value:Surgical Pathology Report                         Case: [id number]                                   Authorizing " Provider:  Dilma Norwood MD           Collected:           10/07/2023 0938              [facility]       Received:            10/07/2023 1306                                     [facility]                                                             Pathologist:           Henrik Conley MD                                                    Specimen:    Stomach                                                                                    Final Diagnosis 10/07/2023                      Value:This result contains rich text formatting which cannot be displayed here.    Additional Information 10/07/2023                      Value:This result contains rich text formatting which cannot be displayed here.    Gross Description 10/07/2023                      Value:This result contains rich text formatting which cannot be displayed here.    Clinical Information 10/07/2023                      Value:Cold bx r/o H.Pylori         Radiology Results:   No results found.

## 2023-12-28 NOTE — PATIENT INSTRUCTIONS
Gas and Bloating   WHAT YOU NEED TO KNOW:   Gas is air that collects in your digestive system (stomach or intestines). Bloating is the tight, full feeling you get from too much gas.       DISCHARGE INSTRUCTIONS:   Return to the emergency department if:   You have severe abdominal pain.    You have blood in your bowel movement.    Call your doctor if:   You have a fever.    You vomit or have diarrhea.    You lose weight without trying.    You have questions or concerns about your condition or care.    Medicines:   Gas relief medicines  help decrease gas pain and bloating. These are available without a doctor's order.    Take your medicine as directed.  Contact your healthcare provider if you think your medicine is not helping or if you have side effects. Tell your provider if you are allergic to any medicine. Keep a list of the medicines, vitamins, and herbs you take. Include the amounts, and when and why you take them. Bring the list or the pill bottles to follow-up visits. Carry your medicine list with you in case of an emergency.    Manage or prevent gas and bloating:   Keep a record.  Write down what you eat and drink and how often you pass gas each day.    Eat and drink slowly.  Choose foods that do not cause gas, such as meat, poultry, fish, and eggs. Avoid high-fat foods and vegetables or starches that can cause gas. Do not drink carbonated drinks. Add foods back into your diet one at a time after 1 week. If the food causes symptoms, avoid it.    Be physically active.  Physical activity, such as exercise, can help relieve gas and constipation. Physical activity can also help you lose weight and keep it off. Your healthcare provider can help you create a physical activity plan.         Do not smoke cigarettes or chew gum.  These can cause you to swallow air.    Make sure your dentures fit properly.  Have your dentures fixed if they are loose. Loose dentures can cause you to swallow too much air.    Follow up  with your doctor as directed:  Write down your questions so you remember to ask them during your visits.  © Copyright [employer] 2023 Information is for End User's use only and may not be sold, redistributed or otherwise used for commercial purposes.  The above information is an  only. It is not intended as medical advice for individual conditions or treatments. Talk to your doctor, nurse or pharmacist before following any medical regimen to see if it is safe and effective for you.

## 2024-02-16 ENCOUNTER — ANNUAL EXAM (OUTPATIENT)
Dept: OBGYN CLINIC | Facility: CLINIC | Age: 29
End: 2024-02-16
Payer: COMMERCIAL

## 2024-02-16 VITALS
BODY MASS INDEX: 22.24 KG/M2 | HEIGHT: 66 IN | WEIGHT: 138.4 LBS | DIASTOLIC BLOOD PRESSURE: 80 MMHG | SYSTOLIC BLOOD PRESSURE: 120 MMHG

## 2024-02-16 DIAGNOSIS — Z01.419 ENCOUNTER FOR ANNUAL ROUTINE GYNECOLOGICAL EXAMINATION: Primary | ICD-10-CM

## 2024-02-16 PROCEDURE — G0145 SCR C/V CYTO,THINLAYER,RESCR: HCPCS

## 2024-02-16 PROCEDURE — S0612 ANNUAL GYNECOLOGICAL EXAMINA: HCPCS

## 2024-02-16 RX ORDER — NORETHINDRONE ACETATE AND ETHINYL ESTRADIOL 1MG-20(21)
1 KIT ORAL DAILY
Qty: 84 TABLET | Refills: 4 | Status: SHIPPED | OUTPATIENT
Start: 2024-02-16

## 2024-02-16 NOTE — ASSESSMENT & PLAN NOTE
Pap collected today. ASCCP guidelines reviewed.   Happy with current OCPs. Refills sent. ACHES reviewed.   Declined STD screening, low risk.  S/p Gardasil vaccine series.   Breast self awareness encouraged.   Healthy lifestyle encouraged.   F/u annually and PRN

## 2024-02-16 NOTE — PROGRESS NOTES
Assessment/Plan:    Encounter for annual routine gynecological examination  Pap collected today. ASCCP guidelines reviewed.   Happy with current OCPs. Refills sent. ACHES reviewed.   Declined STD screening, low risk.  S/p Gardasil vaccine series.   Breast self awareness encouraged.   Healthy lifestyle encouraged.   F/u annually and PRN       Diagnoses and all orders for this visit:    Encounter for annual routine gynecological examination  -     Liquid-based pap, screening  -     norethindrone-ethinyl estradiol (Junel FE 1/20) 1-20 MG-MCG per tablet; Take 1 tablet by mouth daily          Health Maintenance:    Last PAP: 2021. NILM  Next PAP Due: collected today     Gardasil Vaccine Series: She has had the Gardasil series.    Subjective    CC: Yearly Exam     Kristen Lanza is a 28 y.o. female  here for an annual exam.  She has no questions or concerns today.     Menarche: 12 Y/O    Patient's last menstrual period was 2024 (exact date).  Sexual activity: She is sexually active   Contraception: OCPs  STD testing:  She does not want STD testing today.   non smoker, social drinker    Her menstrual cycles are regular every 28-30 days. She denies any issues with bleeding or her menses. She denies breast concerns, abnormal vaginal discharge, vaginal itching, odor, irritation, bowel/bladder dysfunction, urinary symptoms, pelvic pain, or dyspareunia today.     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     Family hx of breast cancer: PGM  Family hx of ovarian cancer: denies  Family hx of colon cancer: denies     Past Medical History:   Diagnosis Date    Asthma     sports induced    Cardiac disorder     Mild tricuspid insufficiency      Past Surgical History:   Procedure Laterality Date    ARTHROSCOPIC REPAIR ACL Left 2018       Immunization History   Administered Date(s) Administered    DTaP 5 1996, 1996, 1997, 1997, 2001    DTaP,unspecified  1997, 1997, 2001, 2007, 2014    HPV Quadrivalent 2007, 10/29/2007, 2008    Hep A, adult 2006, 2008    Hepatitis B 1996, 1996, 1996    IPV 1996, 1996, 1997, 1997, 2001    MMR 1997, 2001    Meningococcal Polysaccharide (MPSV4) 2007    Tdap 2007    Tuberculin Skin Test-PPD Intradermal 10/05/2018, 10/02/2019, 2020, 2021    Varicella 1997, 2007       Family History   Problem Relation Age of Onset    Endometriosis Mother     No Known Problems Father     Breast cancer Paternal Grandmother 76    Diabetes Paternal Grandfather     Kidney disease Paternal Grandfather     Diabetes Family     Hiatal hernia Family     Hypertension Family     Colon cancer Neg Hx     Ovarian cancer Neg Hx     Uterine cancer Neg Hx     Cervical cancer Neg Hx      Social History     Tobacco Use    Smoking status: Never    Smokeless tobacco: Never   Vaping Use    Vaping status: Never Used   Substance Use Topics    Alcohol use: Yes     Comment: social    Drug use: No       Current Outpatient Medications:     albuterol (VENTOLIN HFA) 90 mcg/act inhaler, Take 1 or 2 puffs 15 minutes before physical activity, Disp: 1 Inhaler, Rfl: 0    loratadine (CLARITIN) 10 mg tablet, Take 1 tablet (10 mg total) by mouth daily, Disp: 30 tablet, Rfl:     norethindrone-ethinyl estradiol (Junel FE 1/20) 1-20 MG-MCG per tablet, Take 1 tablet by mouth daily, Disp: 84 tablet, Rfl: 4  Patient Active Problem List    Diagnosis Date Noted    Encounter for annual routine gynecological examination 2023    Exercise-induced bronchospasm 10/05/2018    Tricuspid insufficiency 2016       Allergies   Allergen Reactions    Pineapple - Food Allergy Abdominal Pain, Diarrhea and Itching       OB History    Para Term  AB Living   0 0 0 0 0 0   SAB IAB Ectopic Multiple Live Births   0 0 0 0 0   Obstetric Comments  "  Menarche 13 y.o       Vitals:    02/16/24 0737   BP: 120/80   BP Location: Left arm   Patient Position: Sitting   Weight: 62.8 kg (138 lb 6.4 oz)   Height: 5' 6\" (1.676 m)     Body mass index is 22.34 kg/m².    Review of Systems   Constitutional:  Negative for chills and fever.   Respiratory:  Negative for shortness of breath.    Cardiovascular:  Negative for chest pain.   Gastrointestinal:  Negative for abdominal pain, constipation, diarrhea, nausea and vomiting.   Genitourinary:  Negative for difficulty urinating, dyspareunia, dysuria, frequency, menstrual problem, pelvic pain, urgency, vaginal bleeding, vaginal discharge and vaginal pain.   Musculoskeletal:  Negative for back pain and myalgias.   Skin:  Negative for pallor and rash.   Neurological:  Negative for headaches.   Hematological:  Negative for adenopathy.   Psychiatric/Behavioral:  Negative for dysphoric mood.         Physical Exam  Constitutional:       General: She is not in acute distress.     Appearance: Normal appearance. She is not ill-appearing.   Genitourinary:      Urethral meatus normal.      No lesions in the vagina.      Right Labia: No rash, tenderness, lesions or skin changes.     Left Labia: No tenderness, lesions, skin changes or rash.     No inguinal adenopathy present in the right or left side.     No vaginal discharge, erythema, tenderness, bleeding or ulceration.        Right Adnexa: not tender, not full and no mass present.     Left Adnexa: not tender, not full and no mass present.     Cervix is nulliparous.      No cervical motion tenderness, discharge, friability, lesion, polyp or nabothian cyst.      Uterus is not enlarged, fixed or tender.      No uterine mass detected.     Uterus is anteverted.      No urethral prolapse, tenderness or discharge present.      Bladder is not tender and urgency on palpation not present.       Pelvic exam was performed with patient in the lithotomy position.   Breasts:     Right: No swelling, " inverted nipple, mass, nipple discharge, skin change or tenderness.      Left: No swelling, inverted nipple, mass, nipple discharge, skin change or tenderness.   HENT:      Head: Normocephalic and atraumatic.   Eyes:      Conjunctiva/sclera: Conjunctivae normal.   Pulmonary:      Effort: Pulmonary effort is normal.   Abdominal:      General: There is no distension.      Palpations: Abdomen is soft.      Tenderness: There is no abdominal tenderness.   Musculoskeletal:         General: Normal range of motion.      Cervical back: Neck supple.   Lymphadenopathy:      Upper Body:      Right upper body: No supraclavicular or axillary adenopathy.      Left upper body: No supraclavicular or axillary adenopathy.      Lower Body: No right inguinal adenopathy. No left inguinal adenopathy.   Neurological:      Mental Status: She is alert and oriented to person, place, and time.   Skin:     General: Skin is warm and dry.   Psychiatric:         Mood and Affect: Mood normal.         Behavior: Behavior normal.         Thought Content: Thought content normal.         Judgment: Judgment normal.   Vitals and nursing note reviewed.

## 2024-02-16 NOTE — PATIENT INSTRUCTIONS
Oral Contraception Instructions:    You may start your pills today.      If you miss 1 pill:  Take it as soon as you remember!  You are still covered for birth control.  This may mean you take 2 tablets at the same time, which is okay.    If you miss 2 pills:  You will take 2 pills one day, and then 2 pills the next day to get caught up.  You are still covered for birth control.      If you miss 3 pills:  You now need to use barrier contraception (condoms) as your birth control pills will not be effective.  Please start a new pack of pills today.  You will likely have breakthrough bleeding.    If you have any question please call the office.    Remember, it may take up to 3 months for your body to adjust to a new birth control pill.  Breakthrough bleeding is not uncommon.     Birth Control Pills   WHAT YOU NEED TO KNOW:   What are birth control pills?  Birth control pills are also called oral contraceptives, or the pill. It is medicine that helps prevent pregnancy by stopping ovulation. Ovulation is when the ovaries make and release an egg cell each month. If this egg gets fertilized by sperm, pregnancy occurs. You will need to take the pill at the same time every day. Your healthcare provider will tell you when to start taking the pill. You will also be told what to do if you miss a dose. Instructions will depend on the kind of birth control pills you are taking.   What are the different kinds of birth control pills?  Some kinds are taken for 21 days in a row, followed by 7 days of placebo (no hormones) pills. Other kinds are taken for 24 days followed by 4 days of placebos. Each kind has a certain amount of female hormones. Your provider will decide on the kind that is best for you based on your age and other health conditions.  What may be done before I can start taking birth control pills?  You need to see your healthcare provider to get a prescription. Any of the following may be done before your healthcare  provider gives you a prescription:  Your healthcare provider will ask about diseases and illnesses you have had in the past. Your provider will check your risk for blood clots, heart conditions, or stroke. Tell your provider if you had gastric bypass surgery. This surgery can affect the way your body absorbs medicines such as birth control pills.    Your provider will also check your blood pressure, and may do a breast and pelvic exam. A Pap smear may also be done during the pelvic exam. This is a test to make sure you do not have abnormal changes on your cervix. You may need other tests, such as a urine test to make sure you are not pregnant.    Your provider will ask if you take any medicines and if you smoke. Smoking increases your risk for stroke, heart attack, or a blood clot in your lungs. If you smoke, you should not take certain kinds of birth control pills.    What are the advantages of birth control pills?  When birth control pills are used correctly, the chances of getting pregnant are very low. Birth control pills may help decrease bleeding and pain during your monthly period. They may also help prevent cancer of the uterus and ovaries.  What are the disadvantages of birth control pills?  You may have sudden changes in your mood or feelings while you take birth control pills. You may have nausea and a decreased sex drive. You may have an increased appetite and rapid weight gain. You may also have bleeding in between periods, less frequent periods, vaginal dryness, and breast pain. Birth control pills will not protect you from sexually transmitted infections. Rarely, some birth control pills can increase your risk for a blood clot. This may become life-threatening.  What should I do if I decide I want to get pregnant?  If you are planning to have a baby, ask your healthcare provider when you may stop taking your birth control pills. It may take some time for you to start ovulating again. Ask your  healthcare provider for more information about pregnancy after birth control pills.  When should I start taking birth control pills after I have a baby?  If you are not breastfeeding, you may start taking birth control pills 3 weeks after you give birth. You may be able to take certain types of birth control pills if you are breastfeeding. These pills can be started from 6 weeks to 6 months after you give birth. Ask your healthcare provider for more information about when to start taking birth control pills after you give birth.  What do I need to know about birth control pills and menopause?   Talk with your healthcare provider if you want to take birth control pills around menopause.     Around age 45, you will enter into perimenopause. This means your hormone levels are dropping and you are ovulating less often. You can still become pregnant during this time. The risk for problems, such as miscarriage, are higher if you become pregnant after age 45. Birth control pills will prevent pregnancy, and may also help prevent or relieve some signs and symptoms of menopause. Examples are hot flashes and mood swings.     Your provider will do tests when you are around age 50. The tests may show that you are in menopause. If the tests do not show menopause for sure, you may be able to continue taking the pill up to age 55. The decision will depend on your health and if you have any medical conditions, such as a blood clot.    Call your local emergency number (911 in the [place]) for any of the following:   You have any of the following signs of a stroke:      Numbness or drooping on one side of your face     Weakness in an arm or leg    Confusion or difficulty speaking    Dizziness, a severe headache, or vision loss    You feel lightheaded, short of breath, and have chest pain.    You cough up blood.    When should I seek immediate care?   Your arm or leg feels warm, tender, and painful. It may look swollen and red.    You have  severe pain, numbness, or swelling in your arms or legs.    When should I call my doctor?   You have forgotten to take a birth control pill.    You have mood changes, such as depression, since starting birth control pills.    You have nausea or are vomiting.    You have severe abdominal pain.    You missed a period and have questions or concerns about being pregnant.    You still have bleeding 4 months after taking birth control pills correctly.    You have questions or concerns about your condition or care.    CARE AGREEMENT:   You have the right to help plan your care. Learn about your health condition and how it may be treated. Discuss treatment options with your healthcare providers to decide what care you want to receive. You always have the right to refuse treatment. The above information is an  only. It is not intended as medical advice for individual conditions or treatments. Talk to your doctor, nurse or pharmacist before following any medical regimen to see if it is safe and effective for you.  © Copyright [employer] 2023 Information is for End User's use only and may not be sold, redistributed or otherwise used for commercial purposes.    Pap Smear   AMBULATORY CARE:   A Pap smear,  or Pap test, is used to screen for cervical cancer. It is also used to find precancerous and cancerous cells of the vulva and vagina.  Prepare for a Pap smear:  The best time to schedule the test is right after your period stops. Do not have a Pap smear during your monthly period.  During a Pap smear:   You will lie on your back and place your feet on footrests called stirrups. Your healthcare provider will gently insert a device called a speculum into your vagina. The speculum is used to open the walls of your vagina so your provider can see your cervix.    Your provider will gently take cell samples from your cervix and vagina. The samples are placed in a container with liquid or on a glass slide. They are sent  to a lab and examined for abnormal cells. A test for human papillomavirus (HPV) may be done at the same time. HPV is a sexually transmitted virus that can cause changes in cervical cells.    After a Pap smear:  You may have some spotting the day of your procedure.  Test results:  Your healthcare provider will tell you when you can expect your Pap smear results. It usually takes about 1 to 3 weeks.  Normal results  mean that no cell changes were found on your cervix. You may be able to wait 3 to 5 years for your next Pap smear exam.    Unclear results  may mean they did not get a good sample of cervical tissue. Or, it may mean there are changes in your cells that look abnormal. This could be due to an infection, pregnancy, menopause, or HPV. You may need another Pap smear in 1 year. Your healthcare provider will tell you what to do next.     Abnormal results  mean that cell changes were found on your cervix. This does not mean you have cervical cancer. It could mean you have inflammation or an infection. It could also mean you have HPV or cells that might develop into cancer. Your healthcare provider will explain the abnormal test result to you and go over next steps with you. He or she may recommend a colposcopy. During this procedure, a small scope with a light is used to look more closely at your cervix and vagina.    How often to get a Pap smear:  Pap smears usually start at age 21 and continue until age 65. A Pap smear alone may be done every 3 years. An HPV test alone or with a Pap smear may be done every 5 years, starting at age 30. You may need Pap smears more often or after age 65 if you have any of the following:  Abnormal Pap smear result    Positive HPV test result    A history of cervical cancer, or a high risk for cervical cancer    HIV    A weak immune system    Exposure to diethylstilbestrol (ARJUN) medicine when your mother was pregnant with you    Call your doctor if:   You have severe bleeding.      It has been 3 weeks and you do not have test results.    You have questions or concerns about your condition or care.    © Copyright [employer] 2023 Information is for End User's use only and may not be sold, redistributed or otherwise used for commercial purposes.  The above information is an  only. It is not intended as medical advice for individual conditions or treatments. Talk to your doctor, nurse or pharmacist before following any medical regimen to see if it is safe and effective for you.

## 2024-02-21 PROBLEM — Z01.419 ENCOUNTER FOR ANNUAL ROUTINE GYNECOLOGICAL EXAMINATION: Status: RESOLVED | Noted: 2023-02-14 | Resolved: 2024-02-21

## 2024-02-23 LAB
LAB AP GYN PRIMARY INTERPRETATION: NORMAL
Lab: NORMAL

## 2024-05-21 ENCOUNTER — OFFICE VISIT (OUTPATIENT)
Dept: FAMILY MEDICINE CLINIC | Facility: CLINIC | Age: 29
End: 2024-05-21
Payer: COMMERCIAL

## 2024-05-21 VITALS
WEIGHT: 134 LBS | SYSTOLIC BLOOD PRESSURE: 110 MMHG | BODY MASS INDEX: 21.53 KG/M2 | HEART RATE: 77 BPM | HEIGHT: 66 IN | TEMPERATURE: 97.4 F | OXYGEN SATURATION: 98 % | DIASTOLIC BLOOD PRESSURE: 70 MMHG

## 2024-05-21 DIAGNOSIS — Z00.00 ANNUAL PHYSICAL EXAM: Primary | ICD-10-CM

## 2024-05-21 DIAGNOSIS — J45.990 EXERCISE-INDUCED BRONCHOSPASM: ICD-10-CM

## 2024-05-21 DIAGNOSIS — Z11.1 PPD SCREENING TEST: ICD-10-CM

## 2024-05-21 PROCEDURE — 86580 TB INTRADERMAL TEST: CPT

## 2024-05-21 PROCEDURE — 99395 PREV VISIT EST AGE 18-39: CPT | Performed by: FAMILY MEDICINE

## 2024-05-21 RX ORDER — ALBUTEROL SULFATE 90 UG/1
AEROSOL, METERED RESPIRATORY (INHALATION)
Qty: 6.7 G | Refills: 2 | Status: SHIPPED | OUTPATIENT
Start: 2024-05-21 | End: 2024-05-23 | Stop reason: SDUPTHER

## 2024-05-21 NOTE — PROGRESS NOTES
Adult Annual Physical  Name: Kristen Lanza      : 1995      MRN: [id number]  Encounter Provider: Chantale Suero DO  Encounter Date: 2024   Encounter department: [facility] [address]    Assessment & Plan   1. Annual physical exam  2. Exercise-induced bronchospasm  -     albuterol (Ventolin HFA) 90 mcg/act inhaler; Take 1 or 2 puffs 15 minutes before physical activity  3. PPD screening test  -     TB Skin Test    Immunizations and preventive care screenings were discussed with patient today. Appropriate education was printed on patient's after visit summary.    Counseling:  Alcohol/drug use: discussed moderation in alcohol intake, the recommendations for healthy alcohol use, and avoidance of illicit drug use.  Dental Health: discussed importance of regular tooth brushing, flossing, and dental visits.  Sexual health: discussed sexually transmitted diseases, partner selection, use of condoms, avoidance of unintended pregnancy, and contraceptive alternatives.  Exercise: the importance of regular exercise/physical activity was discussed. Recommend exercise 3-5 times per week for at least 30 minutes.     Depression Screening and Follow-up Plan: Patient was screened for depression during today's encounter. They screened negative with a PHQ-2 score of 0.      History of Present Illness     Adult Annual Physical:  Patient presents for annual physical.     Diet and Physical Activity:  - Diet/Nutrition: well balanced diet.  - Exercise: 3-4 times a week on average.    Depression Screening:  - PHQ-2 Score: 0    General Health:  - Sleep: sleeps well and 7-8 hours of sleep on average.  - Hearing: normal hearing right ear and normal hearing left ear.  - Vision: wears glasses, goes for regular eye exams and most recent eye exam < 1 year ago.  - Dental: regular dental visits and brushes teeth twice daily. flossing regularly    /GYN Health:  - Follows with GYN: yes.   -  "Menopause: premenopausal.   - Last menstrual cycle: 5/21/2024.   - History of STDs: no  - Contraception: oral contraceptives.      Advanced Care Planning:  - Has an advanced directive?: no    - Has a durable medical POA?: no    - ACP document given to patient?: yes      Review of Systems      Objective     /70 (BP Location: Left arm, Patient Position: Sitting, Cuff Size: Standard)   Pulse 77   Temp (!) 97.4 °F (36.3 °C) (Tympanic)   Ht 5' 6\" (1.676 m)   Wt 60.8 kg (134 lb)   SpO2 98%   BMI 21.63 kg/m²     Physical Exam  Vitals reviewed.   Constitutional:       General: She is not in acute distress.     Appearance: Normal appearance.   HENT:      Head: Normocephalic and atraumatic.      Right Ear: External ear normal.      Left Ear: External ear normal.      Nose: Nose normal.      Mouth/Throat:      Mouth: Mucous membranes are moist.   Eyes:      Extraocular Movements: Extraocular movements intact.      Conjunctiva/sclera: Conjunctivae normal.      Pupils: Pupils are equal, round, and reactive to light.   Cardiovascular:      Rate and Rhythm: Normal rate and regular rhythm.      Heart sounds: Normal heart sounds.   Pulmonary:      Effort: Pulmonary effort is normal.      Breath sounds: Normal breath sounds.   Abdominal:      General: Bowel sounds are normal. There is no distension.      Palpations: Abdomen is soft.      Tenderness: There is no abdominal tenderness.   Musculoskeletal:      Cervical back: Neck supple.      Right lower leg: No edema.      Left lower leg: No edema.   Lymphadenopathy:      Cervical: No cervical adenopathy.   Skin:     General: Skin is warm.      Capillary Refill: Capillary refill takes less than 2 seconds.      Findings: No rash.   Neurological:      Mental Status: She is alert. Mental status is at baseline.     Administrative Statements       DO Chris English [facility]  5/21/2024 8:05 AM        "

## 2024-05-21 NOTE — PROGRESS NOTES
PPD Placement note  Kristen ELLIE Lanza, 29 y.o. female is here today for placement of PPD test  Reason for PPD test: work  Pt taken PPD test before: yes  Verified in allergy area and with patient that they are not allergic to the products PPD is made of (Phenol or Tween). Yes  Is patient taking any oral or IV steroid medication now or have they taken it in the last month? no  Has the patient ever received the BCG vaccine?: no  Has the patient been in recent contact with anyone known or suspected of having active TB disease?: no      P:  PPD placed on 5/21/2024.  Patient advised to return for reading within 48-72 hours.     Ppd placed in [facility]

## 2024-05-23 ENCOUNTER — CLINICAL SUPPORT (OUTPATIENT)
Dept: FAMILY MEDICINE CLINIC | Facility: CLINIC | Age: 29
End: 2024-05-23

## 2024-05-23 DIAGNOSIS — Z11.1 PPD NEGATIVE: Primary | ICD-10-CM

## 2024-05-23 DIAGNOSIS — J45.990 EXERCISE-INDUCED BRONCHOSPASM: ICD-10-CM

## 2024-05-23 RX ORDER — ALBUTEROL SULFATE 90 UG/1
AEROSOL, METERED RESPIRATORY (INHALATION)
Qty: 20.1 G | Refills: 1 | Status: SHIPPED | OUTPATIENT
Start: 2024-05-23

## 2024-05-23 NOTE — PROGRESS NOTES
PPD Reading Note  PPD read and results entered in [employer].  Result: 0.0 mm induration.  Interpretation: negative  If test not read within 48-72 hours of initial placement, patient advised to repeat in other arm 1-3 weeks after this test.  Allergic reaction: no

## 2024-06-29 ENCOUNTER — OFFICE VISIT (OUTPATIENT)
Age: 29
End: 2024-06-29
Payer: COMMERCIAL

## 2024-06-29 VITALS
RESPIRATION RATE: 18 BRPM | BODY MASS INDEX: 22.11 KG/M2 | WEIGHT: 137 LBS | OXYGEN SATURATION: 100 % | HEART RATE: 91 BPM | SYSTOLIC BLOOD PRESSURE: 134 MMHG | DIASTOLIC BLOOD PRESSURE: 63 MMHG | TEMPERATURE: 97.3 F

## 2024-06-29 DIAGNOSIS — L50.9 URTICARIA: Primary | ICD-10-CM

## 2024-06-29 PROCEDURE — G0382 LEV 3 HOSP TYPE B ED VISIT: HCPCS | Performed by: PHYSICIAN ASSISTANT

## 2024-06-29 PROCEDURE — S9083 URGENT CARE CENTER GLOBAL: HCPCS | Performed by: PHYSICIAN ASSISTANT

## 2024-06-29 RX ORDER — PREDNISONE 20 MG/1
60 TABLET ORAL DAILY
Qty: 15 TABLET | Refills: 0 | Status: SHIPPED | OUTPATIENT
Start: 2024-06-29 | End: 2024-07-04

## 2024-06-29 NOTE — PROGRESS NOTES
"  [facility]        NAME: Kristen Lanza is a 29 y.o. female  : 1995    MRN: [id number]  DATE: 2024  TIME: 12:18 PM      Assessment and Plan     Urticaria [L50.9]  1. Urticaria  predniSONE 20 mg tablet    Lyme Total AB W Reflex to IGM/IGG        Note:   Will give prednisone for probable urticaria   Testing patient for Lyme due to the rash on her thighs - as this is resembling more of a Lyme rash than urticaria   Told patient to wait 7-10 days to get the test done or it will be a false negative   If positive for Lyme - will send over antibiotics.     Patient Instructions   There are no Patient Instructions on file for this visit.     Follow up with primary care provider.   Go to ER if symptoms worsen.    Chief Complaint     Chief Complaint   Patient presents with    Urticaria     Hives on bilateral legs and arms. Started yesterday on the left leg she used hydrocortisone cream and took benadryl but it spread.Yesterday took Dayquil d/t congestion and then a store brand sinus pressure pill.         History of Present Illness     Patient presents with \"hives\" on both arms, both legs, and both feet x yesterday around 6pm. She states she was congested yesterday. She took DayQuil and a sinus pill, then the rash started a few hours later. She tried cortisone and benadryl with temporary relief. Denies shortness of breath and throat closing sensation.         Review of Systems     Review of Systems   Constitutional:  Negative for chills, fatigue and fever.   HENT:  Positive for congestion and postnasal drip. Negative for ear pain, rhinorrhea, sinus pressure, sinus pain and sore throat.    Eyes:  Negative for pain and visual disturbance.   Respiratory:  Negative for cough, chest tightness and shortness of breath.    Cardiovascular:  Negative for chest pain and palpitations.   Gastrointestinal:  Negative for abdominal pain, diarrhea, nausea and vomiting.   Genitourinary:  Negative for dysuria and " hematuria.   Musculoskeletal:  Negative for arthralgias, back pain and myalgias.   Skin:  Positive for rash.   Neurological:  Negative for dizziness, seizures, syncope, numbness and headaches.   All other systems reviewed and are negative.        Current Medications       Current Outpatient Medications:     albuterol (Ventolin HFA) 90 mcg/act inhaler, Take 1 or 2 puffs 15 minutes before physical activity, Disp: 20.1 g, Rfl: 1    loratadine (CLARITIN) 10 mg tablet, Take 1 tablet (10 mg total) by mouth daily, Disp: 30 tablet, Rfl:     norethindrone-ethinyl estradiol (Junel FE 1/20) 1-20 MG-MCG per tablet, Take 1 tablet by mouth daily, Disp: 84 tablet, Rfl: 4    predniSONE 20 mg tablet, Take 3 tablets (60 mg total) by mouth daily for 5 days, Disp: 15 tablet, Rfl: 0    Current Allergies     Allergies as of 06/29/2024 - Reviewed 06/29/2024   Allergen Reaction Noted    Pineapple - food allergy Abdominal Pain, Diarrhea, and Itching 02/14/2023              The following portions of the patient's history were reviewed and updated as appropriate: allergies, current medications, past family history, past medical history, past social history, past surgical history, and problem list.     Past Medical History:   Diagnosis Date    Asthma     sports induced    Cardiac disorder     Mild tricuspid insufficiency        Past Surgical History:   Procedure Laterality Date    ARTHROSCOPIC REPAIR ACL Left 01/12/2018       Family History   Problem Relation Age of Onset    Endometriosis Mother     No Known Problems Father     Breast cancer Paternal Grandmother 76    Diabetes Paternal Grandfather     Kidney disease Paternal Grandfather     Diabetes Family     Hiatal hernia Family     Hypertension Family     Colon cancer Neg Hx     Ovarian cancer Neg Hx     Uterine cancer Neg Hx     Cervical cancer Neg Hx          Medications have been verified.        Objective     /63   Pulse 91   Temp (!) 97.3 °F (36.3 °C)   Resp 18   Wt 62.1 kg  (137 lb)   LMP 06/18/2024 (Exact Date)   SpO2 100%   BMI 22.11 kg/m²   Patient's last menstrual period was 06/18/2024 (exact date).         Physical Exam     Physical Exam  Vitals and nursing note reviewed.   Constitutional:       Appearance: Normal appearance. She is normal weight.   HENT:      Head: Normocephalic and atraumatic.      Nose: Nose normal.      Mouth/Throat:      Mouth: Mucous membranes are moist.      Pharynx: Oropharynx is clear.   Cardiovascular:      Rate and Rhythm: Normal rate and regular rhythm.      Heart sounds: Normal heart sounds.   Pulmonary:      Effort: Pulmonary effort is normal.      Breath sounds: Normal breath sounds.   Skin:     General: Skin is warm and dry.      Findings: Rash present.      Comments: Erythematous large wheals on bilateral arms, bilateral legs, and bilateral feet. On bilateral anterior upper thighs - erythematous geographic patches with central clearing.    Neurological:      General: No focal deficit present.      Mental Status: She is alert and oriented to person, place, and time.   Psychiatric:         Mood and Affect: Mood normal.         Behavior: Behavior normal.